# Patient Record
Sex: FEMALE | Race: OTHER | HISPANIC OR LATINO
[De-identification: names, ages, dates, MRNs, and addresses within clinical notes are randomized per-mention and may not be internally consistent; named-entity substitution may affect disease eponyms.]

---

## 2017-04-24 ENCOUNTER — APPOINTMENT (OUTPATIENT)
Dept: ENDOCRINOLOGY | Facility: CLINIC | Age: 47
End: 2017-04-24

## 2017-04-24 VITALS
BODY MASS INDEX: 32.66 KG/M2 | DIASTOLIC BLOOD PRESSURE: 80 MMHG | OXYGEN SATURATION: 98 % | SYSTOLIC BLOOD PRESSURE: 110 MMHG | HEART RATE: 68 BPM | HEIGHT: 61 IN | WEIGHT: 173 LBS

## 2017-05-08 LAB
T4 FREE SERPL-MCNC: 0.7 NG/DL
TSH SERPL-ACNC: 8.99 UIU/ML

## 2017-10-24 ENCOUNTER — APPOINTMENT (OUTPATIENT)
Dept: ENDOCRINOLOGY | Facility: CLINIC | Age: 47
End: 2017-10-24

## 2017-11-13 ENCOUNTER — RX RENEWAL (OUTPATIENT)
Age: 47
End: 2017-11-13

## 2017-12-04 ENCOUNTER — RX RENEWAL (OUTPATIENT)
Age: 47
End: 2017-12-04

## 2017-12-07 ENCOUNTER — APPOINTMENT (OUTPATIENT)
Dept: ORTHOPEDIC SURGERY | Facility: CLINIC | Age: 47
End: 2017-12-07

## 2018-09-21 ENCOUNTER — APPOINTMENT (OUTPATIENT)
Dept: OBGYN | Facility: CLINIC | Age: 48
End: 2018-09-21
Payer: COMMERCIAL

## 2018-09-21 VITALS
SYSTOLIC BLOOD PRESSURE: 116 MMHG | HEIGHT: 61 IN | DIASTOLIC BLOOD PRESSURE: 78 MMHG | BODY MASS INDEX: 33.04 KG/M2 | WEIGHT: 175 LBS | HEART RATE: 79 BPM

## 2018-09-21 DIAGNOSIS — Z01.419 ENCOUNTER FOR GYNECOLOGICAL EXAMINATION (GENERAL) (ROUTINE) W/OUT ABNORMAL FINDINGS: ICD-10-CM

## 2018-09-21 PROCEDURE — 99396 PREV VISIT EST AGE 40-64: CPT

## 2018-09-24 LAB — HPV HIGH+LOW RISK DNA PNL CVX: NOT DETECTED

## 2018-09-28 LAB — CYTOLOGY CVX/VAG DOC THIN PREP: NORMAL

## 2018-12-05 ENCOUNTER — RX RENEWAL (OUTPATIENT)
Age: 48
End: 2018-12-05

## 2019-01-08 ENCOUNTER — APPOINTMENT (OUTPATIENT)
Dept: ENDOCRINOLOGY | Facility: CLINIC | Age: 49
End: 2019-01-08
Payer: COMMERCIAL

## 2019-01-08 VITALS
SYSTOLIC BLOOD PRESSURE: 118 MMHG | DIASTOLIC BLOOD PRESSURE: 80 MMHG | HEART RATE: 86 BPM | OXYGEN SATURATION: 98 % | BODY MASS INDEX: 33.26 KG/M2 | WEIGHT: 176 LBS

## 2019-01-08 PROCEDURE — 99214 OFFICE O/P EST MOD 30 MIN: CPT

## 2019-01-08 RX ORDER — OSELTAMIVIR PHOSPHATE 75 MG/1
75 CAPSULE ORAL
Qty: 10 | Refills: 0 | Status: DISCONTINUED | COMMUNITY
Start: 2019-01-04

## 2019-01-08 RX ORDER — AMOXICILLIN 500 MG/1
500 CAPSULE ORAL
Qty: 20 | Refills: 0 | Status: DISCONTINUED | COMMUNITY
Start: 2018-12-03

## 2019-01-08 NOTE — PHYSICAL EXAM
[Alert] : alert [No Acute Distress] : no acute distress [Well Nourished] : well nourished [Normal Sclera/Conjunctiva] : normal sclera/conjunctiva [Normal Oropharynx] : the oropharynx was normal [Supple] : the neck was supple [No Respiratory Distress] : no respiratory distress [Normal Rate and Effort] : normal respiratory rhythm and effort [No Accessory Muscle Use] : no accessory muscle use [Clear to Auscultation] : lungs were clear to auscultation bilaterally [Normal Rate] : heart rate was normal  [Normal S1, S2] : normal S1 and S2 [Regular Rhythm] : with a regular rhythm [Pedal Pulses Normal] : the pedal pulses are present [No Edema] : there was no peripheral edema [No Varicosities] : there were no varicosital changes [Normal Bowel Sounds] : normal bowel sounds [Not Tender] : non-tender [Soft] : abdomen soft [Not Distended] : not distended [Post Cervical Nodes] : posterior cervical nodes [Anterior Cervical Nodes] : anterior cervical nodes [No Spinal Tenderness] : no spinal tenderness [No Stigmata of Cushings Syndrome] : no stigmata of cushings syndrome [Normal Gait] : normal gait [No Clubbing, Cyanosis] : no clubbing  or cyanosis of the fingernails [No Involuntary Movements] : no involuntary movements were seen [Normal Reflexes] : deep tendon reflexes were 2+ and symmetric [No Motor Deficits] : the motor exam was normal [No Tremors] : no tremors [Oriented x3] : oriented to person, place, and time [Normal Affect] : the affect was normal [Normal Mood] : the mood was normal [Kyphosis] : no kyphosis present [de-identified] : +multinodular goiter [de-identified] : normal

## 2019-01-08 NOTE — RESULTS/DATA
[Hologic] : hologic [L1 - L4] : L1 - L4 [T-Score ___] : T-score: [unfilled] [FreeTextEntry2] : 11/2016 [FreeTextEntry1] : 5/2013: Spine -0.3 Femoral Neck -1.1

## 2019-01-08 NOTE — REVIEW OF SYSTEMS
[Nasal Congestion] : nasal congestion [Polydipsia] : polydipsia [All other systems negative] : All other systems negative [Fatigue] : no fatigue [Recent Weight Gain (___ Lbs)] : no recent weight gain [Recent Weight Loss (___ Lbs)] : no recent weight loss [Fever] : no fever [Chills] : no chills [Blurry Vision] : no blurred vision [Dysphagia] : no dysphagia [Dysphonia] : no dysphonia [Neck Pain] : no neck pain [Chest Pain] : no chest pain [Palpitations] : no palpitations [Shortness Of Breath] : no shortness of breath [Wheezing] : no wheezing was heard [Cough] : no cough [SOB on Exertion] : no shortness of breath during exertion [Nausea] : no nausea [Vomiting] : no vomiting was observed [Constipation] : no constipation [Diarrhea] : no diarrhea [Polyuria] : no polyuria [Depression] : no depression [Anxiety] : no anxiety [FreeTextEntry6] : +snoring [FreeTextEntry8] : premature menopause

## 2019-01-08 NOTE — HISTORY OF PRESENT ILLNESS
[FreeTextEntry1] : 47 y/o F hx of hypothyroidism since age 8, seen initially by me 2014, was supposed to be on levothyroxine 100 mcg, but not taking it. Never got the prescription and forgot to take. Returned for follow-up  back on levothyroxine 100 mcg daily with TSH 0.32 Free T4 1.5 (10/2016) from 1.10 (2015). +Mother and grandmother with hx of hypothyroidism. Initially had +fatigue, +forgetfulness, +cold intolerance, +constipation, +dry skin, +weight gain. +occasional palpitations. +nausea. Now most symptoms improved, but had noticed 20 lb. weight gain now stable. LMP . Worked up by Dr. Bond unclear reason for premature menopause. Recommended Premarin, but did not take it. Noticed enlargement of neck. Hx of thyroid U/S with multinodular goiter. With FNA of 1 nodule  which was benign. Last thyroid U/S 10/2016 with heterogenous thyroid with pseudo nodules. No dysphasia, no dysphonia, no orthopnea. No hx of childhood radiation exposure. . No hx of thyroid d/o during pregnancy. Last DXA 2016 with osteopenia. Has not been taking levothyroxine for the past 3 weeks as she ran out of the medication.

## 2019-01-08 NOTE — PROCEDURE
[[employer] e 2008 model, 10-12 MHz frequencies] : multiple real time longitudinal and transverse images were obtained using a high resolution ultrasound with a linear transducer, [employer] e 2008 model, 10-12 MHz frequencies. All measurements will be reported as longitudinal x dwayne-posterior x transverse. [Report dated ___] : Report dated [unfilled] [Multinodular Goiter] : multinodular goiter [] : pseudomacronodular architecture [There are no distinct nodules visualized.] : There are no distinct nodules visualized. [FreeTextEntry1] : 4.50 x 1.98 x 2.19 [FreeTextEntry5] : 4.82 x 2.43 x 2.24 [FreeTextEntry2] : 0.86

## 2019-01-08 NOTE — DATA REVIEWED
[FreeTextEntry1] : DXA 11/2016: Spine -1.6 Femoral Neck -1.4\par \par Labs 10/2016:\par TSH 0.32\par Free T4 1.5\par TPO Ab +\par CBC normal\par CMP normal except AlkP of 124\par LDL 40\par HDL 50\par Chol 158\par Trig 342\par A1c 5.3%\par \par Thyroid Ultrasound Report 10/19/16:\par \par Technique: multiple real time longitudinal and transverse images were obtained using a high resolution ultrasound with a linear transducer, [employer] e 2008 model, 10-12 MHz frequencies. All measurements will be reported as longitudinal x dwayne-posterior x transverse. \par Comparison: Report dated 8/2015. \par \par Indication: multinodular goiter. \par \par Findings: \par The right thyroid lobe measures 4.50 x 1.98 x 2.19 cm. The left thyroid lobe measures 4.82 x 2.43 x 2.24 cm. The isthmus measures 0.86 cm. \par The right thyroid lobe has a heterogeneous parenchyma. It has multiple ill defined areas of hypoechogenicity and pseudomacronodular architecture. \par The left thyroid lobe has a heterogeneous parenchyma . It has multiple ill defined areas of hypoechogenicity and pseudomacronodular architecture. \par \par There are no distinct nodules visualized. \par \par \par Labs 8/2015:\par TSH 1.10\par \par Thyroid Ultrasound Report 4/24/15:\par Technique: Multiple real time longitudinal and transverse images were obtained using a high resolution ultrasound with a linear transducer, [employer] e 2008 model, 10-12 MHz frequencies. All measurements will be reported as longitudinal x dwayne-posterior x transverse. \par Comparison: Report dated 8/2014. \par \par Indication: multinodular goiter. \par \par Findings: \par The right thyroid lobe measures 4.81 x 2.02 x 1.93 cm. The left thyroid lobe measures 4.82 x 2.24 x 2.64 cm. The isthmus measures 0.80 cm. \par The right thyroid lobe has a heterogeneous parenchyma. It has multiple ill defined areas of hypoechogenicity and pseudomacronodular architecture. \par The left thyroid lobe has a heterogeneous parenchyma . It has multiple ill defined areas of hypoechogenicity and pseudomacronodular architecture. \par \par There are no distinct nodules visualized. \par \par Labs 4/21/15:\par TSH 1.35\par Free T4 1.1\par TPO Ab +\par \par Prolactin 19.8\par TSH 5.9 (2012)\par \par Thyroid U/S performed today in office 8/6/14- Bilaterally enlarged heterogenous gland with nodularity. 1 discrete nodule in right lower lobe measuring 0.85x1.16x1.19 hypoechoic, no internal vascularity or calcifications, regular margins.  Isthmus measures 0.83cm, Right Lobe 2.24x2.16x3.93, Left Lobe 2.37x2.43x4.62. Appears consistent with Hashimoto's.

## 2019-05-28 ENCOUNTER — APPOINTMENT (OUTPATIENT)
Dept: ORTHOPEDIC SURGERY | Facility: CLINIC | Age: 49
End: 2019-05-28

## 2019-10-17 ENCOUNTER — RX RENEWAL (OUTPATIENT)
Age: 49
End: 2019-10-17

## 2019-11-05 ENCOUNTER — RX RENEWAL (OUTPATIENT)
Age: 49
End: 2019-11-05

## 2019-11-19 ENCOUNTER — RX RENEWAL (OUTPATIENT)
Age: 49
End: 2019-11-19

## 2020-02-11 ENCOUNTER — APPOINTMENT (OUTPATIENT)
Dept: ENDOCRINOLOGY | Facility: CLINIC | Age: 50
End: 2020-02-11
Payer: COMMERCIAL

## 2020-02-11 VITALS
HEIGHT: 61 IN | SYSTOLIC BLOOD PRESSURE: 118 MMHG | HEART RATE: 75 BPM | OXYGEN SATURATION: 96 % | BODY MASS INDEX: 34.74 KG/M2 | TEMPERATURE: 98.2 F | WEIGHT: 184 LBS | DIASTOLIC BLOOD PRESSURE: 79 MMHG | RESPIRATION RATE: 16 BRPM

## 2020-02-11 PROCEDURE — 99214 OFFICE O/P EST MOD 30 MIN: CPT

## 2020-02-11 NOTE — PHYSICAL EXAM
[Alert] : alert [No Acute Distress] : no acute distress [Well Nourished] : well nourished [Normal Sclera/Conjunctiva] : normal sclera/conjunctiva [Normal Oropharynx] : the oropharynx was normal [Supple] : the neck was supple [No Respiratory Distress] : no respiratory distress [Normal Rate and Effort] : normal respiratory rhythm and effort [No Accessory Muscle Use] : no accessory muscle use [Clear to Auscultation] : lungs were clear to auscultation bilaterally [Normal Rate] : heart rate was normal  [Normal S1, S2] : normal S1 and S2 [Regular Rhythm] : with a regular rhythm [Pedal Pulses Normal] : the pedal pulses are present [No Edema] : there was no peripheral edema [No Varicosities] : there were no varicosital changes [Normal Bowel Sounds] : normal bowel sounds [Not Tender] : non-tender [Soft] : abdomen soft [Not Distended] : not distended [Post Cervical Nodes] : posterior cervical nodes [Anterior Cervical Nodes] : anterior cervical nodes [No Spinal Tenderness] : no spinal tenderness [No Stigmata of Cushings Syndrome] : no stigmata of cushings syndrome [Normal Gait] : normal gait [No Clubbing, Cyanosis] : no clubbing  or cyanosis of the fingernails [No Involuntary Movements] : no involuntary movements were seen [Normal Reflexes] : deep tendon reflexes were 2+ and symmetric [No Motor Deficits] : the motor exam was normal [No Tremors] : no tremors [Oriented x3] : oriented to person, place, and time [Normal Affect] : the affect was normal [Normal Mood] : the mood was normal [de-identified] : normal [Kyphosis] : no kyphosis present [de-identified] : +multinodular goiter

## 2020-02-11 NOTE — HISTORY OF PRESENT ILLNESS
[FreeTextEntry1] : 49 y/o F hx of hypothyroidism since age 8, seen initially by me 2014, was supposed to be on levothyroxine 100 mcg, but not taking it. Never got the prescription and forgot to take. Returned for follow-up  back on levothyroxine 100 mcg daily with TSH 0.32 Free T4 1.5 (10/2016) from 1.10 (2015). +Mother and grandmother with hx of hypothyroidism. Initially had +fatigue, +forgetfulness, +cold intolerance, +constipation, +dry skin, +weight gain. +occasional palpitations. +nausea. Now most symptoms improved, but had noticed 20 lb. weight gain. LMP . Worked up by Dr. Bnod unclear reason for premature menopause. Recommended Premarin, but did not take it. Noticed enlargement of neck. Hx of thyroid U/S with multinodular goiter. With FNA of 1 nodule  which was benign. Last thyroid U/S 10/2016 with heterogenous thyroid with pseudo nodules. No dysphasia, no dysphonia, no orthopnea. Reports increased snoring, apnea symptoms noted by  and SOB during the day. No hx of childhood radiation exposure. . No hx of thyroid d/o during pregnancy. Last DXA 2016 with osteopenia.

## 2020-02-11 NOTE — REVIEW OF SYSTEMS
[Polydipsia] : polydipsia [All other systems negative] : All other systems negative [Fatigue] : fatigue [Recent Weight Gain (___ Lbs)] : recent [unfilled] ~Ulb weight gain [Shortness Of Breath] : shortness of breath [SOB on Exertion] : shortness of breath during exertion [Recent Weight Loss (___ Lbs)] : no recent weight loss [Fever] : no fever [Chills] : no chills [Dysphagia] : no dysphagia [Blurry Vision] : no blurred vision [Dysphonia] : no dysphonia [Chest Pain] : no chest pain [Neck Pain] : no neck pain [Nasal Congestion] : no nasal congestion [Wheezing] : no wheezing was heard [Cough] : no cough [Palpitations] : no palpitations [Nausea] : no nausea [Vomiting] : no vomiting was observed [Constipation] : no constipation [Diarrhea] : no diarrhea [Polyuria] : no polyuria [FreeTextEntry6] : +snoring [Depression] : no depression [Anxiety] : no anxiety [FreeTextEntry8] : premature menopause

## 2020-02-11 NOTE — PROCEDURE
[[employer] e 2008 model, 10-12 MHz frequencies] : multiple real time longitudinal and transverse images were obtained using a high resolution ultrasound with a linear transducer, [employer] e 2008 model, 10-12 MHz frequencies. All measurements will be reported as longitudinal x dwayne-posterior x transverse. [Report dated ___] : Report dated [unfilled] [Multinodular Goiter] : multinodular goiter [] : pseudomacronodular architecture [There are no distinct nodules visualized.] : There are no distinct nodules visualized. [FreeTextEntry1] : 4.50 x 1.98 x 2.19 [FreeTextEntry2] : 0.86 [FreeTextEntry5] : 4.82 x 2.43 x 2.24

## 2020-02-11 NOTE — DATA REVIEWED
[FreeTextEntry1] : DXA 11/2016: Spine -1.6 Femoral Neck -1.4\par \par Labs 10/2016:\par TSH 0.32\par Free T4 1.5\par TPO Ab +\par CBC normal\par CMP normal except AlkP of 124\par LDL 40\par HDL 50\par Chol 158\par Trig 342\par A1c 5.3%\par \par Thyroid Ultrasound Report 10/19/16:\par \par Technique: multiple real time longitudinal and transverse images were obtained using a high resolution ultrasound with a linear transducer, [employer] e 2008 model, 10-12 MHz frequencies. All measurements will be reported as longitudinal x dwayne-posterior x transverse. \par Comparison: Report dated 8/2015. \par \par Indication: multinodular goiter. \par \par Findings: \par The right thyroid lobe measures 4.50 x 1.98 x 2.19 cm. The left thyroid lobe measures 4.82 x 2.43 x 2.24 cm. The isthmus measures 0.86 cm. \par The right thyroid lobe has a heterogeneous parenchyma. It has multiple ill defined areas of hypoechogenicity and pseudomacronodular architecture. \par The left thyroid lobe has a heterogeneous parenchyma . It has multiple ill defined areas of hypoechogenicity and pseudomacronodular architecture. \par \par There are no distinct nodules visualized. \par \par \par Labs 8/2015:\par TSH 1.10\par \par Thyroid Ultrasound Report 4/24/15:\par Technique: Multiple real time longitudinal and transverse images were obtained using a high resolution ultrasound with a linear transducer, [employer] e 2008 model, 10-12 MHz frequencies. All measurements will be reported as longitudinal x dwayne-posterior x transverse. \par Comparison: Report dated 8/2014. \par \par Indication: multinodular goiter. \par \par Findings: \par The right thyroid lobe measures 4.81 x 2.02 x 1.93 cm. The left thyroid lobe measures 4.82 x 2.24 x 2.64 cm. The isthmus measures 0.80 cm. \par The right thyroid lobe has a heterogeneous parenchyma. It has multiple ill defined areas of hypoechogenicity and pseudomacronodular architecture. \par The left thyroid lobe has a heterogeneous parenchyma . It has multiple ill defined areas of hypoechogenicity and pseudomacronodular architecture. \par \par There are no distinct nodules visualized. \par \par Labs 4/21/15:\par TSH 1.35\par Free T4 1.1\par TPO Ab +\par \par Prolactin 19.8\par TSH 5.9 (2012)\par \par Thyroid U/S performed today in office 8/6/14- Bilaterally enlarged heterogenous gland with nodularity. 1 discrete nodule in right lower lobe measuring 0.85x1.16x1.19 hypoechoic, no internal vascularity or calcifications, regular margins.  Isthmus measures 0.83cm, Right Lobe 2.24x2.16x3.93, Left Lobe 2.37x2.43x4.62. Appears consistent with Hashimoto's.

## 2020-02-11 NOTE — ASSESSMENT
[FreeTextEntry1] : 49 y/o F w/\par \par 1. Hypothyroidism- Likely Hashimoto's given family hx and ultrasound findings. c/w levothyroxine at 100 mcg daily. Pt. to take daily in am on empty stomach at least 30 min prior to breakfast. Will repeat TFTs today. \par \par 2. Multinodular Goiter- consistent with Hashimoto's. Thyroid U/S performed 10/2016 (see results section for full description). She currently has no symptoms of tracheal compression or obstruction. Appears to be more of a cosmetic concern. Will continue to monitor with serial ultrasounds every 12  months to reassess nodularity. Will hold off on any FNA at this time at the nodule does not appear suspicious. Will have patient send prior ultrasound reports and pathology report from prior evaluation of thyroid nodules. \par \par 3. [place]- recommend sleep study

## 2020-02-12 LAB
T4 FREE SERPL-MCNC: 0.6 NG/DL
TSH SERPL-ACNC: 28.6 UIU/ML

## 2020-02-23 ENCOUNTER — FORM ENCOUNTER (OUTPATIENT)
Age: 50
End: 2020-02-23

## 2020-04-25 ENCOUNTER — MESSAGE (OUTPATIENT)
Age: 50
End: 2020-04-25

## 2020-05-04 ENCOUNTER — APPOINTMENT (OUTPATIENT)
Dept: DISASTER EMERGENCY | Facility: CLINIC | Age: 50
End: 2020-05-04

## 2020-05-05 LAB
SARS-COV-2 IGG SERPL IA-ACNC: <0.1 INDEX
SARS-COV-2 IGG SERPL QL IA: NEGATIVE

## 2020-06-03 ENCOUNTER — NON-APPOINTMENT (OUTPATIENT)
Age: 50
End: 2020-06-03

## 2020-07-12 ENCOUNTER — FORM ENCOUNTER (OUTPATIENT)
Age: 50
End: 2020-07-12

## 2020-08-03 ENCOUNTER — APPOINTMENT (OUTPATIENT)
Dept: ENDOCRINOLOGY | Facility: CLINIC | Age: 50
End: 2020-08-03
Payer: SELF-PAY

## 2020-08-03 VITALS
HEIGHT: 61 IN | WEIGHT: 176 LBS | DIASTOLIC BLOOD PRESSURE: 71 MMHG | RESPIRATION RATE: 15 BRPM | OXYGEN SATURATION: 95 % | TEMPERATURE: 98.3 F | BODY MASS INDEX: 33.23 KG/M2 | HEART RATE: 96 BPM | SYSTOLIC BLOOD PRESSURE: 106 MMHG

## 2020-08-03 PROCEDURE — 99214 OFFICE O/P EST MOD 30 MIN: CPT

## 2020-08-04 NOTE — ASSESSMENT
[FreeTextEntry1] : 51 y/o F w/\par \par 1. Iatrogenic Hyperthyoiidism- Likely Hashimoto's given family hx and ultrasound findings. decrease levothyroxine to 75 mcg daily. Pt. to take daily in am on empty stomach at least 30 min prior to breakfast. Will repeat TFTs in 8 weeks. \par \par 2. Multinodular Goiter- consistent with Hashimoto's. Thyroid U/S performed 10/2016 (see results section for full description). She currently has no symptoms of tracheal compression or obstruction. Appears to be more of a cosmetic concern. Will continue to monitor with serial ultrasounds every 12  months to reassess nodularity. Will hold off on any FNA at this time at the nodule does not appear suspicious. Will have patient send prior ultrasound reports and pathology report from prior evaluation of thyroid nodules. \par \par 3. [place]- recommended sleep study with moderate [place], refer to pulmonary to obtain CPAP.

## 2020-08-04 NOTE — DATA REVIEWED
[FreeTextEntry1] : Labs \par TSH 0.08\par Free T4 1.4\par LFTs normal\par \par DXA 11/2016: Spine -1.6 Femoral Neck -1.4\par \par Labs 10/2016:\par TSH 0.32\par Free T4 1.5\par TPO Ab +\par CBC normal\par CMP normal except AlkP of 124\par LDL 40\par HDL 50\par Chol 158\par Trig 342\par A1c 5.3%\par \par Thyroid Ultrasound Report 10/19/16:\par \par Technique: multiple real time longitudinal and transverse images were obtained using a high resolution ultrasound with a linear transducer, [employer] e 2008 model, 10-12 MHz frequencies. All measurements will be reported as longitudinal x dwayne-posterior x transverse. \par Comparison: Report dated 8/2015. \par \par Indication: multinodular goiter. \par \par Findings: \par The right thyroid lobe measures 4.50 x 1.98 x 2.19 cm. The left thyroid lobe measures 4.82 x 2.43 x 2.24 cm. The isthmus measures 0.86 cm. \par The right thyroid lobe has a heterogeneous parenchyma. It has multiple ill defined areas of hypoechogenicity and pseudomacronodular architecture. \par The left thyroid lobe has a heterogeneous parenchyma . It has multiple ill defined areas of hypoechogenicity and pseudomacronodular architecture. \par \par There are no distinct nodules visualized. \par \par \par Labs 8/2015:\par TSH 1.10\par \par Thyroid Ultrasound Report 4/24/15:\par Technique: Multiple real time longitudinal and transverse images were obtained using a high resolution ultrasound with a linear transducer, [employer] e 2008 model, 10-12 MHz frequencies. All measurements will be reported as longitudinal x dwayne-posterior x transverse. \par Comparison: Report dated 8/2014. \par \par Indication: multinodular goiter. \par \par Findings: \par The right thyroid lobe measures 4.81 x 2.02 x 1.93 cm. The left thyroid lobe measures 4.82 x 2.24 x 2.64 cm. The isthmus measures 0.80 cm. \par The right thyroid lobe has a heterogeneous parenchyma. It has multiple ill defined areas of hypoechogenicity and pseudomacronodular architecture. \par The left thyroid lobe has a heterogeneous parenchyma . It has multiple ill defined areas of hypoechogenicity and pseudomacronodular architecture. \par \par There are no distinct nodules visualized. \par \par Labs 4/21/15:\par TSH 1.35\par Free T4 1.1\par TPO Ab +\par \par Prolactin 19.8\par TSH 5.9 (2012)\par \par Thyroid U/S performed today in office 8/6/14- Bilaterally enlarged heterogenous gland with nodularity. 1 discrete nodule in right lower lobe measuring 0.85x1.16x1.19 hypoechoic, no internal vascularity or calcifications, regular margins.  Isthmus measures 0.83cm, Right Lobe 2.24x2.16x3.93, Left Lobe 2.37x2.43x4.62. Appears consistent with Hashimoto's.

## 2020-08-04 NOTE — PHYSICAL EXAM
[Alert] : alert [Healthy Appearance] : healthy appearance [Well Nourished] : well nourished [No Acute Distress] : no acute distress [Well Developed] : well developed [No Proptosis] : no proptosis [Thyroid Not Enlarged] : the thyroid was not enlarged [No Thyroid Nodules] : no palpable thyroid nodules [No Respiratory Distress] : no respiratory distress [Normal Rate and Effort] : normal respiratory rate and effort [No Accessory Muscle Use] : no accessory muscle use [Normal S1, S2] : normal S1 and S2 [Clear to Auscultation] : lungs were clear to auscultation bilaterally [Normal Rate] : heart rate was normal [Normal Bowel Sounds] : normal bowel sounds [No Edema] : no peripheral edema [Regular Rhythm] : with a regular rhythm [Not Tender] : non-tender [Not Distended] : not distended [Kyphosis] : no kyphosis present [Soft] : abdomen soft [No Clubbing, Cyanosis] : no clubbing  or cyanosis of the fingernails [No Stigmata of Cushings Syndrome] : no stigmata of Cushings Syndrome [No Involuntary Movements] : no involuntary movements were seen [Normal Strength/Tone] : muscle strength and tone were normal [Acne] : no acne [No Motor Deficits] : the motor exam was normal [Oriented x3] : oriented to person, place, and time [No Tremors] : no tremors [Normal Affect] : the affect was normal [Normal Mood] : the mood was normal

## 2020-08-04 NOTE — REVIEW OF SYSTEMS
[Fatigue] : fatigue [Recent Weight Gain (___ Lbs)] : no recent weight gain [Dysphagia] : no dysphagia [Visual Field Defect] : no visual field defect [Recent Weight Loss (___ Lbs)] : no recent weight loss [Neck Pain] : no neck pain [Dysphonia] : no dysphonia [Palpitations] : palpitations [Chest Pain] : no chest pain [Shortness Of Breath] : no shortness of breath [Vomiting] : no vomiting [Nausea] : no nausea [Constipation] : no constipation [Diarrhea] : no diarrhea [Polyuria] : no polyuria [Joint Pain] : no joint pain [Tremors] : no tremors [All other systems negative] : All other systems negative [Heat Intolerance] : heat intolerance

## 2020-08-04 NOTE — HISTORY OF PRESENT ILLNESS
[FreeTextEntry1] : 49 y/o F hx of hypothyroidism since age 8, seen initially by me 2014, was supposed to be on levothyroxine 100 mcg, but not taking it. Never got the prescription and forgot to take. Returned for follow-up  back on levothyroxine 100 mcg daily with TSH 0.32 Free T4 1.5 (10/2016) from 1.10 (2015). Seen by cardiology with palpitations. Levothyroxine decreased to 88 mcg. Remains chemically hyperthyroid with some symptoms of anxiety and palpitations. +Mother and grandmother with hx of hypothyroidism. Initially had +fatigue, +forgetfulness, +cold intolerance, +constipation, +dry skin, +weight gain. +occasional palpitations. +nausea.  LMP . Worked up by Dr. Bond unclear reason for premature menopause. Recommended Premarin, but did not take it. Noticed enlargement of neck. Hx of thyroid U/S with multinodular goiter. With FNA of 1 nodule  which was benign. Last thyroid U/S 10/2016 with heterogenous thyroid with pseudo nodules. No dysphasia, no dysphonia, no orthopnea. Reports increased snoring, apnea symptoms noted by  and SOB during the day. Referred or sleep study with evidence of moderate seep apnea recommended CPAP has not yet started. No hx of childhood radiation exposure. . No hx of thyroid d/o during pregnancy. Last DXA 2016 with osteopenia.

## 2020-08-04 NOTE — PROCEDURE
[[employer] 2008 model, 10-12 MHz frequencies] : multiple real time longitudinal and transverse images were obtained using a high resolution ultrasound with a linear transducer, [employer] 2008 model, 10-12 MHz frequencies. All measurements will be reported as longitudinal x dwayne-posterior x transverse. [Report dated ___] : Report dated [unfilled] [Multinodular Goiter] : multinodular goiter [] : pseudomacronodular architecture [There are no distinct nodules visualized.] : There are no distinct nodules visualized. [FreeTextEntry2] : 0.86 [FreeTextEntry5] : 4.82 x 2.43 x 2.24 [FreeTextEntry1] : 4.50 x 1.98 x 2.19

## 2020-08-06 ENCOUNTER — APPOINTMENT (OUTPATIENT)
Dept: PULMONOLOGY | Facility: CLINIC | Age: 50
End: 2020-08-06

## 2020-11-10 ENCOUNTER — APPOINTMENT (OUTPATIENT)
Dept: ENDOCRINOLOGY | Facility: CLINIC | Age: 50
End: 2020-11-10

## 2020-11-17 ENCOUNTER — TRANSCRIPTION ENCOUNTER (OUTPATIENT)
Age: 50
End: 2020-11-17

## 2021-05-28 ENCOUNTER — RX RENEWAL (OUTPATIENT)
Age: 51
End: 2021-05-28

## 2021-12-13 ENCOUNTER — APPOINTMENT (OUTPATIENT)
Dept: OBGYN | Facility: CLINIC | Age: 51
End: 2021-12-13
Payer: COMMERCIAL

## 2021-12-13 DIAGNOSIS — R89.6 ABNORMAL CYTOLOGICAL FINDINGS IN SPECIMENS FROM OTHER ORGANS, SYSTEMS AND TISSUES: ICD-10-CM

## 2021-12-13 DIAGNOSIS — M25.562 PAIN IN LEFT KNEE: ICD-10-CM

## 2021-12-13 DIAGNOSIS — R94.5 ABNORMAL RESULTS OF LIVER FUNCTION STUDIES: ICD-10-CM

## 2021-12-13 PROCEDURE — 99396 PREV VISIT EST AGE 40-64: CPT

## 2021-12-13 RX ORDER — CEFUROXIME AXETIL 500 MG/1
500 TABLET ORAL
Qty: 28 | Refills: 0 | Status: COMPLETED | COMMUNITY
Start: 2021-11-02

## 2021-12-13 NOTE — HISTORY OF PRESENT ILLNESS
[Patient reported mammogram was normal] : Patient reported mammogram was normal [Patient reported breast sonogram was normal] : Patient reported breast sonogram was normal [Patient reported PAP Smear was normal] : Patient reported PAP Smear was normal [Patient reported colonoscopy was normal] : Patient reported colonoscopy was normal [FreeTextEntry1] : 50YO P2 LMP 2012 presents for checkup without complaints. [Mammogramdate] : 2019 [BreastSonogramDate] : 2019 [PapSmeardate] : 2018 [ColonoscopyDate] : 2019

## 2021-12-15 LAB — HPV HIGH+LOW RISK DNA PNL CVX: NOT DETECTED

## 2021-12-16 ENCOUNTER — OUTPATIENT (OUTPATIENT)
Dept: OUTPATIENT SERVICES | Facility: HOSPITAL | Age: 51
LOS: 1 days | End: 2021-12-16
Payer: COMMERCIAL

## 2021-12-16 ENCOUNTER — APPOINTMENT (OUTPATIENT)
Dept: CT IMAGING | Facility: CLINIC | Age: 51
End: 2021-12-16
Payer: COMMERCIAL

## 2021-12-16 DIAGNOSIS — J32.9 CHRONIC SINUSITIS, UNSPECIFIED: ICD-10-CM

## 2021-12-16 PROCEDURE — 70486 CT MAXILLOFACIAL W/O DYE: CPT

## 2021-12-16 PROCEDURE — 70486 CT MAXILLOFACIAL W/O DYE: CPT | Mod: 26

## 2021-12-21 LAB — CYTOLOGY CVX/VAG DOC THIN PREP: ABNORMAL

## 2022-05-04 ENCOUNTER — RX RENEWAL (OUTPATIENT)
Age: 52
End: 2022-05-04

## 2022-06-22 ENCOUNTER — APPOINTMENT (OUTPATIENT)
Dept: ENDOCRINOLOGY | Facility: CLINIC | Age: 52
End: 2022-06-22
Payer: COMMERCIAL

## 2022-06-22 VITALS
WEIGHT: 166 LBS | HEART RATE: 89 BPM | HEIGHT: 61 IN | SYSTOLIC BLOOD PRESSURE: 116 MMHG | DIASTOLIC BLOOD PRESSURE: 78 MMHG | RESPIRATION RATE: 18 BRPM | TEMPERATURE: 97.9 F | OXYGEN SATURATION: 95 % | BODY MASS INDEX: 31.34 KG/M2

## 2022-06-22 PROCEDURE — 99214 OFFICE O/P EST MOD 30 MIN: CPT

## 2022-06-22 RX ORDER — AZITHROMYCIN 250 MG/1
250 TABLET, FILM COATED ORAL
Qty: 6 | Refills: 0 | Status: DISCONTINUED | COMMUNITY
Start: 2022-01-26

## 2022-06-22 RX ORDER — PREDNISONE 20 MG/1
20 TABLET ORAL
Qty: 10 | Refills: 0 | Status: DISCONTINUED | COMMUNITY
Start: 2022-01-26

## 2022-06-22 NOTE — ASSESSMENT
[FreeTextEntry1] : 53 y/o F w/\par \par 1. Iatrogenic Hyperthyoiidism- Likely Hashimoto's given family hx and ultrasound findings. decreased levothyroxine to 75 mcg daily in 2020. Pt. has not followed up since. Advised pt. to take levothyroxine daily in am on empty stomach at least 30 min prior to breakfast. Will repeat TFTs. \par \par 2. Multinodular Goiter- consistent with Hashimoto's. Thyroid U/S performed 10/2016 (see results section for full description). She currently has no symptoms of tracheal compression or obstruction. Appears to be more of a cosmetic concern. Will continue to monitor with serial ultrasounds every 12  months to reassess nodularity. Will hold off on any FNA at this time at the nodule does not appear suspicious. Will have patient send prior ultrasound reports and pathology report from prior evaluation of thyroid nodules. \par \par 3. KENTON- now improved with weight loss\par \par Pt. has not been seen in almost 2 years. Prep time with review of labs and interval progress notes and consultations \par Discussion with patient regarding thyroid hormone regimen, KENTON, and multinodular goiter with management plan, risks and benefits, treatment options and goals of care answering all patients questions and addressing all concerns \par Focused Thyroid US performed today with discussion of results with the patient\par Post-visit completion charting and review \par Total Time 38 min\par \par Specifically causes, evaluation, treatment options, risks, complications, and benefits of available therapies were discussed. Questions were answered.\par \par The submitted E/M billing level for this visit reflects the total time spent on the day of the visit including face-to-face time spent with the patient, non-face-to-face review of medical records and relevant information, documentation, and asynchronous communication with the patient after a visit via phone, email, or patient’s EHR portal after the visit. \par The medical records reviewed are either scanned into the chart or reviewed with the patient using a patient’s electronic medical records portal for patients with records not available to [facility] via electronic transmission platforms from other institutions and labs. \par Time spend counseling and performing coordination of care was also included in determining the appropriate EM billing level.\par \par I have reviewed and verified information regarding the chief complaint and history recorded by the ancillary staff and/or the patient. I have independently reviewed and interpreted tests performed by other physicians and facilities as necessary. \par \par I have discussed with the patient differential diagnosis, reason for auxiliary tests if ordered, risks, benefits, alternatives, and complications of each form of therapy were discussed. \par \par

## 2022-06-22 NOTE — PHYSICAL EXAM
[Alert] : alert [Well Nourished] : well nourished [Healthy Appearance] : healthy appearance [No Acute Distress] : no acute distress [Well Developed] : well developed [No Proptosis] : no proptosis [Thyroid Not Enlarged] : the thyroid was not enlarged [No Thyroid Nodules] : no palpable thyroid nodules [No Respiratory Distress] : no respiratory distress [No Accessory Muscle Use] : no accessory muscle use [Normal Rate and Effort] : normal respiratory rate and effort [Clear to Auscultation] : lungs were clear to auscultation bilaterally [Normal S1, S2] : normal S1 and S2 [Normal Rate] : heart rate was normal [Regular Rhythm] : with a regular rhythm [No Edema] : no peripheral edema [Normal Bowel Sounds] : normal bowel sounds [Not Tender] : non-tender [Not Distended] : not distended [Soft] : abdomen soft [No Stigmata of Cushings Syndrome] : no stigmata of Cushings Syndrome [No Clubbing, Cyanosis] : no clubbing  or cyanosis of the fingernails [No Involuntary Movements] : no involuntary movements were seen [Normal Strength/Tone] : muscle strength and tone were normal [No Motor Deficits] : the motor exam was normal [No Tremors] : no tremors [Oriented x3] : oriented to person, place, and time [Normal Affect] : the affect was normal [Normal Mood] : the mood was normal [Kyphosis] : no kyphosis present [Acne] : no acne

## 2022-06-22 NOTE — PROCEDURE
[MOO[employer] e 2008 model, 10-12 MHz frequencies] : multiple real time longitudinal and transverse images were obtained using a high resolution ultrasound with a linear transducer, M[employer] e 2008 model, 10-12 MHz frequencies. All measurements will be reported as longitudinal x dwayne-posterior x transverse. [Report dated ___] : Report dated [unfilled] [Multinodular Goiter] : multinodular goiter [] : pseudomacronodular architecture [There are no distinct nodules visualized.] : There are no distinct nodules visualized. [FreeTextEntry1] : 4.50 x 1.98 x 2.19 [FreeTextEntry5] : 4.82 x 2.43 x 2.24 [FreeTextEntry2] : 0.86

## 2022-06-22 NOTE — DATA REVIEWED
[FreeTextEntry1] : Thyroid Ultrasound Report 6/22/2022:\par \par Technique: multiple real time longitudinal and transverse images were obtained using a high resolution ultrasound with a linear transducer, In[employer] e 2008 model, 10-12 MHz frequencies. All measurements will be reported as longitudinal x dwayne-posterior x transverse. \par Comparison: Report dated 8/2015. \par \par Indication: multinodular goiter. \par \par Findings: \par The right thyroid lobe measures 4.6 x 2.42 x 2.25 cm. The left thyroid lobe measures 4.8 x 2.57 x 2.32 cm. The isthmus measures 0.92 cm. \par The right thyroid lobe has a heterogeneous parenchyma. It has multiple ill defined areas of hypoechogenicity and pseudomacronodular architecture. \par The left thyroid lobe has a heterogeneous parenchyma . It has multiple ill defined areas of hypoechogenicity and pseudomacronodular architecture. \par \par There are no distinct nodules visualized. \par \par Labs 1/2022:\par Chemically euthyroid\par \par Labs 6/2020:\par TSH 0.08\par Free T4 1.4\par LFTs normal\par \par DXA 11/2016: Spine -1.6 Femoral Neck -1.4\par \par Labs 10/2016:\par TSH 0.32\par Free T4 1.5\par TPO Ab +\par CBC normal\par CMP normal except AlkP of 124\par LDL 40\par HDL 50\par Chol 158\par Trig 342\par A1c 5.3%\par \par Thyroid Ultrasound Report 10/19/16:\par \par Technique: multiple real time longitudinal and transverse images were obtained using a high resolution ultrasound with a linear transducer, InnoPad e 2008 model, 10-12 MHz frequencies. All measurements will be reported as longitudinal x dwayne-posterior x transverse. \par Comparison: Report dated 8/2015. \par \par Indication: multinodular goiter. \par \par Findings: \par The right thyroid lobe measures 4.50 x 1.98 x 2.19 cm. The left thyroid lobe measures 4.82 x 2.43 x 2.24 cm. The isthmus measures 0.86 cm. \par The right thyroid lobe has a heterogeneous parenchyma. It has multiple ill defined areas of hypoechogenicity and pseudomacronodular architecture. \par The left thyroid lobe has a heterogeneous parenchyma . It has multiple ill defined areas of hypoechogenicity and pseudomacronodular architecture. \par \par There are no distinct nodules visualized. \par \par \par Labs 8/2015:\par TSH 1.10\par \par Thyroid Ultrasound Report 4/24/15:\par Technique: Multiple real time longitudinal and transverse images were obtained using a high resolution ultrasound with a linear transducer, InnoPad e 2008 model, 10-12 MHz frequencies. All measurements will be reported as longitudinal x dwayne-posterior x transverse. \par Comparison: Report dated 8/2014. \par \par Indication: multinodular goiter. \par \par Findings: \par The right thyroid lobe measures 4.81 x 2.02 x 1.93 cm. The left thyroid lobe measures 4.82 x 2.24 x 2.64 cm. The isthmus measures 0.80 cm. \par The right thyroid lobe has a heterogeneous parenchyma. It has multiple ill defined areas of hypoechogenicity and pseudomacronodular architecture. \par The left thyroid lobe has a heterogeneous parenchyma . It has multiple ill defined areas of hypoechogenicity and pseudomacronodular architecture. \par \par There are no distinct nodules visualized. \par \par Labs 4/21/15:\par TSH 1.35\par Free T4 1.1\par TPO Ab +\par \par Prolactin 19.8\par TSH 5.9 (2012)\par \par Thyroid U/S performed today in office 8/6/14- Bilaterally enlarged heterogenous gland with nodularity. 1 discrete nodule in right lower lobe measuring 0.85x1.16x1.19 hypoechoic, no internal vascularity or calcifications, regular margins.  Isthmus measures 0.83cm, Right Lobe 2.24x2.16x3.93, Left Lobe 2.37x2.43x4.62. Appears consistent with Hashimoto's.

## 2022-06-22 NOTE — HISTORY OF PRESENT ILLNESS
[FreeTextEntry1] : 53 y/o F hx of hypothyroidism since age 8, seen initially by me 2014, was supposed to be on levothyroxine 100 mcg, but not taking it. Never got the prescription and forgot to take. Returned for follow-up  back on levothyroxine 100 mcg daily with TSH 0.32 Free T4 1.5 (10/2016) from 1.10 (2015). Seen by cardiology with palpitations. Levothyroxine decreased to 88 mcg. Remained chemically hyperthyroid until 2020 with some symptoms of anxiety and palpitations and TSH of 0.08. Recommended decrease dose to 75 mcg. Pt. has not followed up since. Has lost almost 20 lb. since last visit doing [place]. Feeling much better with less palpitations, improvement in fatigue, hot flashes and sleep apnea. Adherent to levothyroxine. Was chemically euthyroid with recent labs by PCP 2022. \par +Mother and grandmother with hx of hypothyroidism. \par LMP .  . No hx of thyroid d/o during pregnancy. Worked up by Dr. Bond unclear reason for premature menopause. Recommended Premarin, but did not take it. \par \par Noticed enlargement of neck. Hx of thyroid U/S with multinodular goiter. With FNA of 1 nodule  which was benign. Last thyroid U/S 10/2016 with heterogenous thyroid with pseudo nodules. No dysphasia, no dysphonia, no orthopnea. Reports  improved snoring. Referred or sleep study with evidence of moderate seep apnea recommended CPAP, but has not needed to use it. No hx of childhood radiation exposure. Last DXA 2016 with osteopenia.

## 2022-06-22 NOTE — REVIEW OF SYSTEMS
[All other systems negative] : All other systems negative [Fatigue] : no fatigue [Recent Weight Gain (___ Lbs)] : no recent weight gain [Recent Weight Loss (___ Lbs)] : no recent weight loss [Visual Field Defect] : no visual field defect [Dysphagia] : no dysphagia [Neck Pain] : no neck pain [Dysphonia] : no dysphonia [Chest Pain] : no chest pain [Palpitations] : no palpitations [Shortness Of Breath] : no shortness of breath [Nausea] : no nausea [Constipation] : no constipation [Vomiting] : no vomiting [Diarrhea] : no diarrhea [Polyuria] : no polyuria [Joint Pain] : no joint pain [Tremors] : no tremors [Heat Intolerance] : no heat intolerance

## 2022-12-23 ENCOUNTER — TRANSCRIPTION ENCOUNTER (OUTPATIENT)
Age: 52
End: 2022-12-23

## 2022-12-23 ENCOUNTER — APPOINTMENT (OUTPATIENT)
Dept: ULTRASOUND IMAGING | Facility: CLINIC | Age: 52
End: 2022-12-23

## 2022-12-23 ENCOUNTER — OUTPATIENT (OUTPATIENT)
Dept: OUTPATIENT SERVICES | Facility: HOSPITAL | Age: 52
LOS: 1 days | End: 2022-12-23
Payer: COMMERCIAL

## 2022-12-23 DIAGNOSIS — Z00.8 ENCOUNTER FOR OTHER GENERAL EXAMINATION: ICD-10-CM

## 2022-12-23 PROCEDURE — 76705 ECHO EXAM OF ABDOMEN: CPT

## 2022-12-23 PROCEDURE — 76705 ECHO EXAM OF ABDOMEN: CPT | Mod: 26

## 2023-01-09 ENCOUNTER — APPOINTMENT (OUTPATIENT)
Dept: ENDOCRINOLOGY | Facility: CLINIC | Age: 53
End: 2023-01-09
Payer: COMMERCIAL

## 2023-01-09 VITALS
DIASTOLIC BLOOD PRESSURE: 66 MMHG | BODY MASS INDEX: 32.1 KG/M2 | OXYGEN SATURATION: 97 % | SYSTOLIC BLOOD PRESSURE: 116 MMHG | WEIGHT: 170 LBS | HEART RATE: 71 BPM | HEIGHT: 61 IN | TEMPERATURE: 98.1 F

## 2023-01-09 PROCEDURE — 99214 OFFICE O/P EST MOD 30 MIN: CPT

## 2023-01-09 NOTE — DATA REVIEWED
[FreeTextEntry1] : Thyroid Ultrasound Report 6/22/2022:\par \par Technique: multiple real time longitudinal and transverse images were obtained using a high resolution ultrasound with a linear transducer, [employer] e 2008 model, 10-12 MHz frequencies. All measurements will be reported as longitudinal x dwayne-posterior x transverse. \par Comparison: Report dated 8/2015. \par \par Indication: multinodular goiter. \par \par Findings: \par The right thyroid lobe measures 4.6 x 2.42 x 2.25 cm. The left thyroid lobe measures 4.8 x 2.57 x 2.32 cm. The isthmus measures 0.92 cm. \par The right thyroid lobe has a heterogeneous parenchyma. It has multiple ill defined areas of hypoechogenicity and pseudomacronodular architecture. \par The left thyroid lobe has a heterogeneous parenchyma . It has multiple ill defined areas of hypoechogenicity and pseudomacronodular architecture. \par \par There are no distinct nodules visualized. \par \par Labs 1/2022:\par Chemically euthyroid\par \par Labs 6/2020:\par TSH 0.08\par Free T4 1.4\par LFTs normal\par \par DXA 11/2016: Spine -1.6 Femoral Neck -1.4\par \par Labs 10/2016:\par TSH 0.32\par Free T4 1.5\par TPO Ab +\par CBC normal\par CMP normal except AlkP of 124\par LDL 40\par HDL 50\par Chol 158\par Trig 342\par A1c 5.3%\par \par Thyroid Ultrasound Report 10/19/16:\par \par Technique: multiple real time longitudinal and transverse images were obtained using a high resolution ultrasound with a linear transducer, [employer] e 2008 model, 10-12 MHz frequencies. All measurements will be reported as longitudinal x dwayne-posterior x transverse. \par Comparison: Report dated 8/2015. \par \par Indication: multinodular goiter. \par \par Findings: \par The right thyroid lobe measures 4.50 x 1.98 x 2.19 cm. The left thyroid lobe measures 4.82 x 2.43 x 2.24 cm. The isthmus measures 0.86 cm. \par The right thyroid lobe has a heterogeneous parenchyma. It has multiple ill defined areas of hypoechogenicity and pseudomacronodular architecture. \par The left thyroid lobe has a heterogeneous parenchyma . It has multiple ill defined areas of hypoechogenicity and pseudomacronodular architecture. \par \par There are no distinct nodules visualized. \par \par \par Labs 8/2015:\par TSH 1.10\par \par Thyroid Ultrasound Report 4/24/15:\par Technique: Multiple real time longitudinal and transverse images were obtained using a high resolution ultrasound with a linear transducer, [employer] e 2008 model, 10-12 MHz frequencies. All measurements will be reported as longitudinal x dwayne-posterior x transverse. \par Comparison: Report dated 8/2014. \par \par Indication: multinodular goiter. \par \par Findings: \par The right thyroid lobe measures 4.81 x 2.02 x 1.93 cm. The left thyroid lobe measures 4.82 x 2.24 x 2.64 cm. The isthmus measures 0.80 cm. \par The right thyroid lobe has a heterogeneous parenchyma. It has multiple ill defined areas of hypoechogenicity and pseudomacronodular architecture. \par The left thyroid lobe has a heterogeneous parenchyma . It has multiple ill defined areas of hypoechogenicity and pseudomacronodular architecture. \par \par There are no distinct nodules visualized. \par \par Labs 4/21/15:\par TSH 1.35\par Free T4 1.1\par TPO Ab +\par \par Prolactin 19.8\par TSH 5.9 (2012)\par \par Thyroid U/S performed today in office 8/6/14- Bilaterally enlarged heterogenous gland with nodularity. 1 discrete nodule in right lower lobe measuring 0.85x1.16x1.19 hypoechoic, no internal vascularity or calcifications, regular margins.  Isthmus measures 0.83cm, Right Lobe 2.24x2.16x3.93, Left Lobe 2.37x2.43x4.62. Appears consistent with Hashimoto's.

## 2023-01-09 NOTE — ASSESSMENT
[FreeTextEntry1] : 53 y/o F w/\par \par 1.Hypothyroidism- Likely Hashimoto's given family hx and ultrasound findings. decreased levothyroxine to 75 mcg daily in 2020. Clinically and chemically euthyroid with last TFTs 12/2022 by PCP Dr. Solorio. Advised pt. to take levothyroxine daily in am on empty stomach at least 30 min prior to breakfast. \par \par 2. Multinodular Goiter- consistent with Hashimoto's. Thyroid U/S performed 10/2016 (see results section for full description). She currently has no symptoms of tracheal compression or obstruction. Appears to be more of a cosmetic concern. Will continue to monitor. Will have patient send prior ultrasound reports and pathology report from prior evaluation of thyroid nodules. \par \par 3. KENTON- now improved with weight loss\par \par 4. Elevated Liver Function Tests- hx of hepatitis. Possible NAFLD. Follow-up with hepatology. \par \par Discussion with patient regarding thyroid hormone regimen, KENTON, and multinodular goiter with management plan, risks and benefits, treatment options and goals of care answering all patients questions and addressing all concerns \par Post-visit completion charting and review \par Total Time 30 min\par \par Specifically causes, evaluation, treatment options, risks, complications, and benefits of available therapies were discussed. Questions were answered.\par \par The submitted E/M billing level for this visit reflects the total time spent on the day of the visit including face-to-face time spent with the patient, non-face-to-face review of medical records and relevant information, documentation, and asynchronous communication with the patient after a visit via phone, email, or patient’s EHR portal after the visit. \par The medical records reviewed are either scanned into the chart or reviewed with the patient using a patient’s electronic medical records portal for patients with records not available to [facility] via electronic transmission platforms from other institutions and labs. \par Time spend counseling and performing coordination of care was also included in determining the appropriate EM billing level.\par \par I have reviewed and verified information regarding the chief complaint and history recorded by the ancillary staff and/or the patient. I have independently reviewed and interpreted tests performed by other physicians and facilities as necessary. \par \par I have discussed with the patient differential diagnosis, reason for auxiliary tests if ordered, risks, benefits, alternatives, and complications of each form of therapy were discussed. \par \par

## 2023-01-09 NOTE — REVIEW OF SYSTEMS
[Recent Weight Loss (___ Lbs)] : recent weight loss: [unfilled] lbs [All other systems negative] : All other systems negative [Fatigue] : no fatigue [Recent Weight Gain (___ Lbs)] : no recent weight gain [Visual Field Defect] : no visual field defect [Dysphagia] : no dysphagia [Neck Pain] : no neck pain [Dysphonia] : no dysphonia [Chest Pain] : no chest pain [Palpitations] : no palpitations [Shortness Of Breath] : no shortness of breath [Nausea] : no nausea [Constipation] : no constipation [Vomiting] : no vomiting [Diarrhea] : no diarrhea [Polyuria] : no polyuria [Joint Pain] : no joint pain [Tremors] : no tremors [Heat Intolerance] : no heat intolerance

## 2023-01-09 NOTE — HISTORY OF PRESENT ILLNESS
[FreeTextEntry1] : 51 y/o F hx of hypothyroidism since age 8, seen initially by me 2014, was supposed to be on levothyroxine 100 mcg, but not taking it. Never got the prescription and forgot to take. Returned for follow-up  back on levothyroxine 100 mcg daily with TSH 0.32 Free T4 1.5 (10/2016) from 1.10 (2015). Seen by cardiology with palpitations. Levothyroxine decreased to 88 mcg. Remained chemically hyperthyroid until 2020 with some symptoms of anxiety and palpitations and TSH of 0.08. Recommended decrease dose to 75 mcg. Lost 20 lb. doing [place]. Weight has remained off. Feeling much better with less palpitations, improvement in fatigue, hot flashes and sleep apnea. Adherent to levothyroxine. Was chemically euthyroid with recent labs by PCP 2022. \par +Mother and grandmother with hx of hypothyroidism. \par LMP .  . No hx of thyroid d/o during pregnancy. Worked up by Dr. Bond unclear reason for premature menopause. Recommended Premarin, but did not take it. \par \par Noticed enlargement of neck. Hx of thyroid U/S with multinodular goiter. With FNA of 1 nodule  which was benign. Last thyroid U/S 10/2016 with heterogenous thyroid with pseudo nodules. No dysphasia, no dysphonia, no orthopnea. Reports  improved snoring. Referred or sleep study with evidence of moderate seep apnea recommended CPAP, but has not needed to use it. No hx of childhood radiation exposure. Last DXA 2016 with osteopenia.

## 2023-01-09 NOTE — PROCEDURE
[[employer] e 2008 model, 10-12 MHz frequencies] : multiple real time longitudinal and transverse images were obtained using a high resolution ultrasound with a linear transducer, Spot[employer] e 2008 model, 10-12 MHz frequencies. All measurements will be reported as longitudinal x dwayne-posterior x transverse. [Report dated ___] : Report dated [unfilled] [Multinodular Goiter] : multinodular goiter [] : pseudomacronodular architecture [There are no distinct nodules visualized.] : There are no distinct nodules visualized. [FreeTextEntry1] : 4.50 x 1.98 x 2.19 [FreeTextEntry5] : 4.82 x 2.43 x 2.24 [FreeTextEntry2] : 0.86

## 2023-01-12 RX ORDER — BENZONATATE 200 MG/1
200 CAPSULE ORAL
Qty: 21 | Refills: 0 | Status: DISCONTINUED | COMMUNITY
Start: 2022-10-26

## 2023-01-26 RX ORDER — SEMAGLUTIDE 0.25 MG/.5ML
0.25 INJECTION, SOLUTION SUBCUTANEOUS
Qty: 3 | Refills: 3 | Status: COMPLETED | COMMUNITY
Start: 2023-01-12 | End: 2023-01-26

## 2023-01-27 ENCOUNTER — OUTPATIENT (OUTPATIENT)
Dept: OUTPATIENT SERVICES | Facility: HOSPITAL | Age: 53
LOS: 1 days | End: 2023-01-27
Payer: COMMERCIAL

## 2023-01-27 VITALS
HEIGHT: 60 IN | HEART RATE: 78 BPM | OXYGEN SATURATION: 98 % | TEMPERATURE: 97 F | DIASTOLIC BLOOD PRESSURE: 84 MMHG | WEIGHT: 174.61 LBS | RESPIRATION RATE: 16 BRPM | SYSTOLIC BLOOD PRESSURE: 135 MMHG

## 2023-01-27 DIAGNOSIS — Z98.51 TUBAL LIGATION STATUS: Chronic | ICD-10-CM

## 2023-01-27 DIAGNOSIS — Z01.818 ENCOUNTER FOR OTHER PREPROCEDURAL EXAMINATION: ICD-10-CM

## 2023-01-27 DIAGNOSIS — D21.22 BENIGN NEOPLASM OF CONNECTIVE AND OTHER SOFT TISSUE OF LEFT LOWER LIMB, INCLUDING HIP: ICD-10-CM

## 2023-01-27 DIAGNOSIS — Z98.890 OTHER SPECIFIED POSTPROCEDURAL STATES: Chronic | ICD-10-CM

## 2023-01-27 DIAGNOSIS — D17.22 BENIGN LIPOMATOUS NEOPLASM OF SKIN AND SUBCUTANEOUS TISSUE OF LEFT ARM: ICD-10-CM

## 2023-01-27 DIAGNOSIS — D21.12 BENIGN NEOPLASM OF CONNECTIVE AND OTHER SOFT TISSUE OF LEFT UPPER LIMB, INCLUDING SHOULDER: ICD-10-CM

## 2023-01-27 LAB
ALBUMIN SERPL ELPH-MCNC: 4 G/DL — SIGNIFICANT CHANGE UP (ref 3.3–5)
ALP SERPL-CCNC: 153 U/L — HIGH (ref 30–120)
ALT FLD-CCNC: 80 U/L DA — HIGH (ref 10–60)
ANION GAP SERPL CALC-SCNC: 6 MMOL/L — SIGNIFICANT CHANGE UP (ref 5–17)
AST SERPL-CCNC: 42 U/L — HIGH (ref 10–40)
BILIRUB SERPL-MCNC: 0.8 MG/DL — SIGNIFICANT CHANGE UP (ref 0.2–1.2)
BUN SERPL-MCNC: 17 MG/DL — SIGNIFICANT CHANGE UP (ref 7–23)
CALCIUM SERPL-MCNC: 9.8 MG/DL — SIGNIFICANT CHANGE UP (ref 8.4–10.5)
CHLORIDE SERPL-SCNC: 103 MMOL/L — SIGNIFICANT CHANGE UP (ref 96–108)
CO2 SERPL-SCNC: 29 MMOL/L — SIGNIFICANT CHANGE UP (ref 22–31)
CREAT SERPL-MCNC: 0.89 MG/DL — SIGNIFICANT CHANGE UP (ref 0.5–1.3)
EGFR: 78 ML/MIN/1.73M2 — SIGNIFICANT CHANGE UP
GLUCOSE SERPL-MCNC: 93 MG/DL — SIGNIFICANT CHANGE UP (ref 70–99)
HCT VFR BLD CALC: 40.4 % — SIGNIFICANT CHANGE UP (ref 34.5–45)
HGB BLD-MCNC: 13.7 G/DL — SIGNIFICANT CHANGE UP (ref 11.5–15.5)
MCHC RBC-ENTMCNC: 30.8 PG — SIGNIFICANT CHANGE UP (ref 27–34)
MCHC RBC-ENTMCNC: 33.9 GM/DL — SIGNIFICANT CHANGE UP (ref 32–36)
MCV RBC AUTO: 90.8 FL — SIGNIFICANT CHANGE UP (ref 80–100)
NRBC # BLD: 0 /100 WBCS — SIGNIFICANT CHANGE UP (ref 0–0)
PLATELET # BLD AUTO: 147 K/UL — LOW (ref 150–400)
POTASSIUM SERPL-MCNC: 4.2 MMOL/L — SIGNIFICANT CHANGE UP (ref 3.5–5.3)
POTASSIUM SERPL-SCNC: 4.2 MMOL/L — SIGNIFICANT CHANGE UP (ref 3.5–5.3)
PROT SERPL-MCNC: 8.3 G/DL — SIGNIFICANT CHANGE UP (ref 6–8.3)
RBC # BLD: 4.45 M/UL — SIGNIFICANT CHANGE UP (ref 3.8–5.2)
RBC # FLD: 12.1 % — SIGNIFICANT CHANGE UP (ref 10.3–14.5)
SODIUM SERPL-SCNC: 138 MMOL/L — SIGNIFICANT CHANGE UP (ref 135–145)
WBC # BLD: 4.8 K/UL — SIGNIFICANT CHANGE UP (ref 3.8–10.5)
WBC # FLD AUTO: 4.8 K/UL — SIGNIFICANT CHANGE UP (ref 3.8–10.5)

## 2023-01-27 PROCEDURE — 36415 COLL VENOUS BLD VENIPUNCTURE: CPT

## 2023-01-27 PROCEDURE — 93005 ELECTROCARDIOGRAM TRACING: CPT

## 2023-01-27 PROCEDURE — 80053 COMPREHEN METABOLIC PANEL: CPT

## 2023-01-27 PROCEDURE — 85027 COMPLETE CBC AUTOMATED: CPT

## 2023-01-27 PROCEDURE — 93010 ELECTROCARDIOGRAM REPORT: CPT

## 2023-01-27 PROCEDURE — G0463: CPT

## 2023-01-27 NOTE — H&P PST ADULT - NSICDXPASTMEDICALHX_GEN_ALL_CORE_FT
PAST MEDICAL HISTORY:  2019 novel coronavirus disease (COVID-19)     Class 1 obesity with body mass index (BMI) of 34.0 to 34.9 in adult     Elevated liver enzymes     H/O hepatitis     Hypothyroid     Mass of arm, left     KENTON (obstructive sleep apnea)

## 2023-01-27 NOTE — H&P PST ADULT - PROBLEM SELECTOR PLAN 1
Excision LEFT Forearm Mass on 02/16/2023  Medical Clearance Dr. Solorio  NPO as per Anesthesia- Synthroid on AM of surgery with sips of water.  Instructions reviewed and questions addressed  Covid19 screening 2/14/23 @ 8am

## 2023-01-27 NOTE — H&P PST ADULT - SKIN COMMENTS
Message left with pt's dtr, Pamela- [phone] to arrange lab appt this week.     Another lab appt and appt with Dr. Jules to be scheduled 3 weeks after this week's lab appt.     Please fu with pt's dtr to arrange appts.      approx 3cm annular soft fixed lesion on left forearm- tender on palpation

## 2023-01-27 NOTE — H&P PST ADULT - HISTORY OF PRESENT ILLNESS
51yo right hand dominant female patient with approximately 3yr history of progressively enlarging mass on left forearm. She states that she has pain when it is palpated and at times the pain radiates to her left wrist. She rates the pain when it occurs at 7/10. She is not taking anything for pain. Excision has been recommended and she presents today for PSTs.

## 2023-01-30 ENCOUNTER — OUTPATIENT (OUTPATIENT)
Dept: OUTPATIENT SERVICES | Facility: HOSPITAL | Age: 53
LOS: 1 days | End: 2023-01-30
Payer: COMMERCIAL

## 2023-01-30 ENCOUNTER — APPOINTMENT (OUTPATIENT)
Dept: MRI IMAGING | Facility: CLINIC | Age: 53
End: 2023-01-30
Payer: COMMERCIAL

## 2023-01-30 ENCOUNTER — APPOINTMENT (OUTPATIENT)
Dept: ENDOCRINOLOGY | Facility: CLINIC | Age: 53
End: 2023-01-30

## 2023-01-30 DIAGNOSIS — Z98.890 OTHER SPECIFIED POSTPROCEDURAL STATES: Chronic | ICD-10-CM

## 2023-01-30 DIAGNOSIS — R74.8 ABNORMAL LEVELS OF OTHER SERUM ENZYMES: ICD-10-CM

## 2023-01-30 DIAGNOSIS — Z98.51 TUBAL LIGATION STATUS: Chronic | ICD-10-CM

## 2023-01-30 PROBLEM — R22.32 LOCALIZED SWELLING, MASS AND LUMP, LEFT UPPER LIMB: Chronic | Status: ACTIVE | Noted: 2023-01-27

## 2023-01-30 PROBLEM — U07.1 COVID-19: Chronic | Status: ACTIVE | Noted: 2023-01-27

## 2023-01-30 PROBLEM — E03.9 HYPOTHYROIDISM, UNSPECIFIED: Chronic | Status: ACTIVE | Noted: 2023-01-27

## 2023-01-30 PROBLEM — G47.33 OBSTRUCTIVE SLEEP APNEA (ADULT) (PEDIATRIC): Chronic | Status: ACTIVE | Noted: 2023-01-27

## 2023-01-30 PROBLEM — Z86.19 PERSONAL HISTORY OF OTHER INFECTIOUS AND PARASITIC DISEASES: Chronic | Status: ACTIVE | Noted: 2023-01-27

## 2023-01-30 PROBLEM — E66.9 OBESITY, UNSPECIFIED: Chronic | Status: ACTIVE | Noted: 2023-01-27

## 2023-01-30 PROCEDURE — A9585: CPT

## 2023-01-30 PROCEDURE — 74183 MRI ABD W/O CNTR FLWD CNTR: CPT | Mod: 26

## 2023-01-30 PROCEDURE — 74183 MRI ABD W/O CNTR FLWD CNTR: CPT

## 2023-01-30 RX ORDER — TIRZEPATIDE 2.5 MG/.5ML
2.5 INJECTION, SOLUTION SUBCUTANEOUS
Qty: 1 | Refills: 5 | Status: COMPLETED | COMMUNITY
Start: 2023-01-26 | End: 2023-01-30

## 2023-02-01 RX ORDER — CHLORHEXIDINE GLUCONATE 213 G/1000ML
1 SOLUTION TOPICAL DAILY
Refills: 0 | Status: DISCONTINUED | OUTPATIENT
Start: 2023-02-16 | End: 2023-03-02

## 2023-02-13 ENCOUNTER — APPOINTMENT (OUTPATIENT)
Dept: OBGYN | Facility: CLINIC | Age: 53
End: 2023-02-13

## 2023-02-15 ENCOUNTER — TRANSCRIPTION ENCOUNTER (OUTPATIENT)
Age: 53
End: 2023-02-15

## 2023-02-15 ENCOUNTER — OUTPATIENT (OUTPATIENT)
Dept: OUTPATIENT SERVICES | Facility: HOSPITAL | Age: 53
LOS: 1 days | End: 2023-02-15
Payer: COMMERCIAL

## 2023-02-15 DIAGNOSIS — Z98.51 TUBAL LIGATION STATUS: Chronic | ICD-10-CM

## 2023-02-15 DIAGNOSIS — Z20.818 CONTACT WITH AND (SUSPECTED) EXPOSURE TO OTHER BACTERIAL COMMUNICABLE DISEASES: ICD-10-CM

## 2023-02-15 DIAGNOSIS — Z98.890 OTHER SPECIFIED POSTPROCEDURAL STATES: Chronic | ICD-10-CM

## 2023-02-15 LAB — SARS-COV-2 RNA SPEC QL NAA+PROBE: SIGNIFICANT CHANGE UP

## 2023-02-15 PROCEDURE — 87635 SARS-COV-2 COVID-19 AMP PRB: CPT

## 2023-02-16 ENCOUNTER — OUTPATIENT (OUTPATIENT)
Dept: OUTPATIENT SERVICES | Facility: HOSPITAL | Age: 53
LOS: 1 days | End: 2023-02-16
Payer: COMMERCIAL

## 2023-02-16 ENCOUNTER — TRANSCRIPTION ENCOUNTER (OUTPATIENT)
Age: 53
End: 2023-02-16

## 2023-02-16 VITALS
RESPIRATION RATE: 18 BRPM | HEART RATE: 66 BPM | OXYGEN SATURATION: 98 % | SYSTOLIC BLOOD PRESSURE: 106 MMHG | DIASTOLIC BLOOD PRESSURE: 67 MMHG

## 2023-02-16 VITALS
SYSTOLIC BLOOD PRESSURE: 100 MMHG | DIASTOLIC BLOOD PRESSURE: 63 MMHG | HEART RATE: 79 BPM | HEIGHT: 60 IN | OXYGEN SATURATION: 100 % | TEMPERATURE: 98 F | WEIGHT: 172.4 LBS | RESPIRATION RATE: 18 BRPM

## 2023-02-16 DIAGNOSIS — D21.22 BENIGN NEOPLASM OF CONNECTIVE AND OTHER SOFT TISSUE OF LEFT LOWER LIMB, INCLUDING HIP: ICD-10-CM

## 2023-02-16 DIAGNOSIS — D17.22 BENIGN LIPOMATOUS NEOPLASM OF SKIN AND SUBCUTANEOUS TISSUE OF LEFT ARM: ICD-10-CM

## 2023-02-16 DIAGNOSIS — Z01.818 ENCOUNTER FOR OTHER PREPROCEDURAL EXAMINATION: ICD-10-CM

## 2023-02-16 DIAGNOSIS — Z98.890 OTHER SPECIFIED POSTPROCEDURAL STATES: Chronic | ICD-10-CM

## 2023-02-16 DIAGNOSIS — Z98.51 TUBAL LIGATION STATUS: Chronic | ICD-10-CM

## 2023-02-16 LAB
ABO RH CONFIRMATION: SIGNIFICANT CHANGE UP
BLD GP AB SCN SERPL QL: SIGNIFICANT CHANGE UP

## 2023-02-16 PROCEDURE — 88304 TISSUE EXAM BY PATHOLOGIST: CPT

## 2023-02-16 PROCEDURE — 86901 BLOOD TYPING SEROLOGIC RH(D): CPT

## 2023-02-16 PROCEDURE — 47562 LAPAROSCOPIC CHOLECYSTECTOMY: CPT

## 2023-02-16 PROCEDURE — 88305 TISSUE EXAM BY PATHOLOGIST: CPT | Mod: 26

## 2023-02-16 PROCEDURE — 88305 TISSUE EXAM BY PATHOLOGIST: CPT

## 2023-02-16 PROCEDURE — 86900 BLOOD TYPING SEROLOGIC ABO: CPT

## 2023-02-16 PROCEDURE — C1889: CPT

## 2023-02-16 PROCEDURE — 88304 TISSUE EXAM BY PATHOLOGIST: CPT | Mod: 26

## 2023-02-16 PROCEDURE — 86850 RBC ANTIBODY SCREEN: CPT

## 2023-02-16 PROCEDURE — 36415 COLL VENOUS BLD VENIPUNCTURE: CPT

## 2023-02-16 DEVICE — CLIP APPLIER COVIDIEN ENDOCLIP II 10MM MED/LG: Type: IMPLANTABLE DEVICE | Status: FUNCTIONAL

## 2023-02-16 RX ORDER — HYDROMORPHONE HYDROCHLORIDE 2 MG/ML
0.5 INJECTION INTRAMUSCULAR; INTRAVENOUS; SUBCUTANEOUS
Refills: 0 | Status: DISCONTINUED | OUTPATIENT
Start: 2023-02-16 | End: 2023-02-16

## 2023-02-16 RX ORDER — CEFOTETAN DISODIUM 1 G
2 VIAL (EA) INJECTION ONCE
Refills: 0 | Status: COMPLETED | OUTPATIENT
Start: 2023-02-16 | End: 2023-02-16

## 2023-02-16 RX ORDER — SODIUM CHLORIDE 9 MG/ML
1000 INJECTION, SOLUTION INTRAVENOUS
Refills: 0 | Status: DISCONTINUED | OUTPATIENT
Start: 2023-02-16 | End: 2023-03-02

## 2023-02-16 RX ORDER — HYDROMORPHONE HYDROCHLORIDE 2 MG/ML
0.25 INJECTION INTRAMUSCULAR; INTRAVENOUS; SUBCUTANEOUS
Refills: 0 | Status: DISCONTINUED | OUTPATIENT
Start: 2023-02-16 | End: 2023-02-16

## 2023-02-16 RX ORDER — CEFAZOLIN SODIUM 1 G
2000 VIAL (EA) INJECTION ONCE
Refills: 0 | Status: DISCONTINUED | OUTPATIENT
Start: 2023-02-16 | End: 2023-02-16

## 2023-02-16 RX ORDER — OXYCODONE AND ACETAMINOPHEN 5; 325 MG/1; MG/1
1 TABLET ORAL ONCE
Refills: 0 | Status: DISCONTINUED | OUTPATIENT
Start: 2023-02-16 | End: 2023-02-16

## 2023-02-16 RX ORDER — OXYCODONE HYDROCHLORIDE 5 MG/1
1 TABLET ORAL
Qty: 12 | Refills: 0
Start: 2023-02-16 | End: 2023-02-18

## 2023-02-16 RX ORDER — ONDANSETRON 8 MG/1
4 TABLET, FILM COATED ORAL ONCE
Refills: 0 | Status: DISCONTINUED | OUTPATIENT
Start: 2023-02-16 | End: 2023-03-02

## 2023-02-16 RX ADMIN — HYDROMORPHONE HYDROCHLORIDE 0.5 MILLIGRAM(S): 2 INJECTION INTRAMUSCULAR; INTRAVENOUS; SUBCUTANEOUS at 10:32

## 2023-02-16 RX ADMIN — CHLORHEXIDINE GLUCONATE 1 APPLICATION(S): 213 SOLUTION TOPICAL at 08:03

## 2023-02-16 RX ADMIN — HYDROMORPHONE HYDROCHLORIDE 0.5 MILLIGRAM(S): 2 INJECTION INTRAMUSCULAR; INTRAVENOUS; SUBCUTANEOUS at 13:09

## 2023-02-16 RX ADMIN — SODIUM CHLORIDE 75 MILLILITER(S): 9 INJECTION, SOLUTION INTRAVENOUS at 10:28

## 2023-02-16 NOTE — ASU DISCHARGE PLAN (ADULT/PEDIATRIC) - CALL YOUR DOCTOR IF YOU HAVE ANY OF THE FOLLOWING:
Bleeding that does not stop/Swelling that gets worse/Pain not relieved by Medications/Fever greater than (need to indicate Fahrenheit or Celsius) Bleeding that does not stop/Swelling that gets worse/Pain not relieved by Medications/Fever greater than (need to indicate Fahrenheit or Celsius)/Wound/Surgical Site with redness, or foul smelling discharge or pus/Increased irritability or sluggishness

## 2023-02-16 NOTE — BRIEF OPERATIVE NOTE - OPERATION/FINDINGS
Laparoscopic Cholecystectomy performed via 4-port incision.   Critical View obtained.   Cystic Duct and Cystic artery clipped.  Incisions closed with 4-0 sutures, covered with steri-strips Laparoscopic Cholecystectomy performed via 4-port incision.   Critical View obtained.   Cystic Duct and Cystic artery clipped.  Incisions closed with 4-0 sutures, covered with steri-strips    consistent with uncomplicated excision of left forearm lipoma

## 2023-02-16 NOTE — BRIEF OPERATIVE NOTE - COMMENTS
AZAM Escobar PA-C provided direct first assist support to the surgeon during this surgical procedure. My involvement included positioning, prepping and draping the patient prior to surgery, ensuring clear visibility and exposure for the surgeon by using instruments such as retractors and suction, closing surgical incisions and dressing wounds. As well as other tasks as directed by the surgeon.

## 2023-02-16 NOTE — ASU PATIENT PROFILE, ADULT - FALL HARM RISK - UNIVERSAL INTERVENTIONS
Bed in lowest position, wheels locked, appropriate side rails in place/Call bell, personal items and telephone in reach/Instruct patient to call for assistance before getting out of bed or chair/Non-slip footwear when patient is out of bed/[place] to call system/Physically safe environment - no spills, clutter or unnecessary equipment/Purposeful Proactive Rounding/Room/bathroom lighting operational, light cord in reach

## 2023-02-16 NOTE — ASU DISCHARGE PLAN (ADULT/PEDIATRIC) - NS MD DC FALL RISK RISK
For information on Fall & Injury Prevention, visit: [email or web address] OR  [email or web address] OR  https://www.cdc.gov/steadi/patient.html

## 2023-02-16 NOTE — ASU PATIENT PROFILE, ADULT - AS SC BRADEN MOBILITY
This is a 89 y/o F with hx RA on chronic prednisone, HTN, CKD (baseline Cr ~1.1), ILD, DM2, wheelchairbound, p/w episode of confusion, poss loss of consciousness. History was obtained from pt and pt's  over phone as per MD documentation. Pt reports 1 week of coughs. Otherwise in usual state of health, until this morning, when pt stated she was not feeling well. Pt had half a bagel, then, while transferring from bed to wheelchair, had an episode of poss loss of consciousness, shaking of her head, eyes rolling back in head, fall. Pt was reportedly unconscious for only a few seconds, then came back. Pt denies losing consciousness at this point, but unable to give a good history as to what happened. Pt has not taking any basaglar at least for the past couple days, as her evening sugars have been in the 90s. Pt has only (4) no limitation

## 2023-02-16 NOTE — ASU DISCHARGE PLAN (ADULT/PEDIATRIC) - CARE PROVIDER_API CALL
Brooklyn Corrales (MD)  ColonRectal Surgery; Surgery  [address] [facility] [address]  [place] [address]  Phone: [phone]  Fax: [phone]  Follow Up Time: 2 weeks

## 2023-02-16 NOTE — ASU PATIENT PROFILE, ADULT - CAREGIVER NAME
Data Source: Patient, [facility] record. 9/3/2020    2:14 PM    Abelardo Beth [id number]    70 y.o. Patient Encounter: [address] Visit    Cancer Diagnosis:  Lung mass  Stage:  Likely 4  Performance Status:  1  Code Status:  Not discussed  Onc History (Copied from prior):   70 male, retired from [employer], ex-smoker (1 pack/day x 40 years, quit in 2017) history of CAD, CHF, CKD (baseline Cr 1.9), PAD, r right common femoral embolectomy, status post multiple LE arterial stents on Plavix, appendectomy, sickle cell trait, right kidney clear cell carcinoma pT1pNX, measuring 5.1 x 4.6 cm status post right nephrectomy 12/17 with negative margins and simply monitor thereafter. More recently patient seen by primary care with shortness of breath, initially tested for COVID 19 which was negative. Further imaging led to a chest x-ray which was abnormal, this was followed by contrasted CT chest 7/16/2020 showing a spiculated nodular opacity in CHRIS measuring 4.5 x 2.4 cm, also note made of prominent mediastinal lymph nodes including a subcarinal lymph node measuring 12 mm. He is now referred to oncology for further work-up and management. Reports coughing has been ongoing for over a year but it has gotten worse. Reports coughs up small amounts of clear phlegm. Hospice Referral:  NA  Interval History:  9/3/20: Pt seen in f/u. Denies worsening SOB. Sating well on RA (in 90s). Reports cough about the same. Recently case was discussed at [facility]: felt to have metastatic lung adenocarcinoma, not sufficient tissue for Caris NGS, repeat EBUS biopsy scheduled for 9/9/20. Will plan for systemic therapy initiation (Carbo/Pemetrexed/Pembrolizumab) thereafter on 9/10/20 while awaiting results of NGS. NCCN Distress Score:  0  REVIEW OF SYSTEMS:  As mentioned above, all other systems were reviewed in full and are negative.     Past Medical History:   Diagnosis Date    Cancer [facility]) kidney ca    Cardiomyopathy [facility])     CHF (congestive heart failure) ([facility] 11/21/2017    55-60% 4/2019    Chronic kidney disease     CKD (chronic kidney disease), stage III (HCC)     Stage 3    Coronary artery disease involving native coronary artery of native heart without angina pectoris 10/25/2017    followed [facility] last cath 10/16/2017 no stents    Diabetes ([facility]     abg fbs 115 last A1c 7.3 1/20/2020    Foot drop, left     Former cigarette smoker     History of embolectomy 07/2019    right common femoral embolectomy    History of kidney cancer 12/2017    kidney- right removed    Long term current use of anticoagulant     Plavix    Sickle cell trait (HCC)     Sickle cell trait    Status post carotid endarterectomy 07/2019    Right side    Thyroid disease        Past Surgical History:   Procedure Laterality Date    HX APPENDECTOMY  1963    HX CAROTID ENDARTERECTOMY      HX NEPHRECTOMY Right 12/2017    IR INSERT TUNL CVC W PORT OVER 5 YEARS  8/31/2020    NC LEFT HEART CATH,PERCUTANEOUS  10/16/2017    no PCI- medical management    VASCULAR SURGERY PROCEDURE UNLIST Bilateral 10/11/2018    iliac angio and stent    VASCULAR SURGERY PROCEDURE UNLIST  05/13/2019    Right lower extremity arteriogram with balloon angioplasty and stent of the right external iliac artery with 8 x 4 S. M.A.R.T. stent.  VASCULAR SURGERY PROCEDURE UNLIST Right 06/20/2019     Right common femoral artery endarterectomy,Right common femoral embolectomy,Right lower extremity arteriogram       Current Outpatient Medications   Medication Sig Dispense Refill    prochlorperazine (Compazine) 10 mg tablet Take 1 Tab by mouth every six (6) hours as needed for Nausea. Indications: nausea and vomiting caused by cancer drugs, nausea and vomiting 90 Tab 3    ondansetron hcl (Zofran) 8 mg tablet Take 1 Tab by mouth every eight (8) hours as needed for Nausea.  Indications: nausea and vomiting caused by cancer drugs 60 Tab 3    lidocaine-prilocaine (EMLA) topical cream Apply  to affected area as needed for Pain. 30 g 1    valsartan (DIOVAN) 80 mg tablet TAKE 1 TABLET BY MOUTH DAILY (Patient taking differently: nightly.) 30 Tab 0    Olamide Pen Needle 32 gauge x 5/32\" ndle use once daily 100 Pen Needle 11    dapagliflozin (Farxiga) 10 mg tab tablet Take 1 Tab by mouth daily. 90 Tab 1    Unithroid 50 mcg tablet Take 1 Tab by mouth Daily (before breakfast). 90 Tab 1    carvediloL (COREG) 12.5 mg tablet Take 1 Tab by mouth two (2) times daily (with meals). 180 Tab 1    atorvastatin (LIPITOR) 80 mg tablet TAKE 1 TABLET BY MOUTH EVERY NIGHT 90 Tab 3    insulin degludec [employer]unes FlexTouch U-100) 100 unit/mL (3 mL) inpn 20 units sq daily  Indications: type 2 diabetes mellitus (Patient taking differently: 20 Units nightly. 20 units sq daily  Indications: type 2 diabetes mellitus) 6 Pen 1    clopidogreL (PLAVIX) 75 mg tab Take 1 Tab by mouth daily.  90 Tab 3    Victoza 3-Denis 0.6 mg/0.1 mL (18 mg/3 mL) pnij ADMINISTER 1.8 MG UNDER THE SKIN DAILY 9 mL 3    ONETOUCH ULTRA BLUE TEST STRIP strip   1       Social History     Socioeconomic History    Marital status: LEGALLY      Spouse name: Not on file    Number of children: Not on file    Years of education: Not on file    Highest education level: Not on file   Tobacco Use    Smoking status: Former Smoker     Packs/day: 1.00     Years: 40.00     Pack years: 40.00     Last attempt to quit: 2017     Years since quitting: 3.6    Smokeless tobacco: Never Used   Substance and Sexual Activity    Alcohol use: Not Currently    Drug use: Not Currently     Comment: none since 2017       Family History   Problem Relation Age of Onset    Diabetes Other     Cancer Mother         Ovarian CA    Heart Disease Mother     Hypertension Mother     Sickle Cell Trait Mother     Heart Disease Father     Sickle Cell Trait Father     Sickle Cell Anemia Brother     Heart Disease Brother    [facility] Hypertension Brother     Cancer Cousin         prostate       Allergies   Allergen Reactions    Aspirin Nausea and Vomiting       PHYSICAL EXAMINATION:  General Appearance: Healthy appearing patient in no acute distress  Vitals reviewed. Visit Vitals  /76 (BP 1 Location: Left arm, BP Patient Position: Sitting)   Pulse 95   Temp 97.8 °F (36.6 °C) (Oral)   Resp 16   Wt 137 lb 4.8 oz (62.3 kg)   SpO2 97%   BMI 25.11 kg/m²     HEENT: No oral or pharyngeal masses, ulceration or thrush noted, no sinus tenderness. Neck is supple with no thyromegaly or JVD noted. Lymph Nodes: No lymphadenopathy noted in the occipital, pre and post auricular, cervical, supra and infraclavicular, axillary, epitrochlear, inguinal, and popliteal region. Breasts: No palpable masses, nipple discharge or skin retraction  Lungs/Thorax: Clear to auscultation, no accessory muscles of respiration being used. Heart: Regular rate and rhythm, normal S1, S2, no appreciable murmurs, rubs, gallops  Abdomen: Soft, nontender, bowel sounds present, no appreciable hepatosplenomegaly, no palpable masses  Extremeties: Good pulses bilaterally, no peripheral edema. Skin: Normal skin tone with no rash, petechiae, ecchymosis noted.   Musculoskeletal: No pain on palpation over bony prominence, no edema, no evidence of gout, no joint or bony deformity  Neurologic: Grossly intact    LABS/IMAGING:    Lab Results   Component Value Date/Time    WBC 5.6 08/31/2020 07:49 AM    HGB 11.2 (L) 08/31/2020 07:49 AM    HCT 34.7 (L) 08/31/2020 07:49 AM    PLATELET 478 12/81/6620 07:49 AM    MCV 79.6 08/31/2020 07:49 AM       Lab Results   Component Value Date/Time    Sodium 142 08/31/2020 07:49 AM    Potassium 4.4 08/31/2020 07:49 AM    Chloride 113 (H) 08/31/2020 07:49 AM    CO2 22 08/31/2020 07:49 AM    Anion gap 7 08/31/2020 07:49 AM    Glucose 141 (H) 08/31/2020 07:49 AM    BUN 37 (H) 08/31/2020 07:49 AM    Creatinine 1.65 (H) 08/31/2020 07:49 AM    BUN/Creatinine ratio 17 01/20/2020 10:57 AM    GFR est AA 53 (L) 08/31/2020 07:49 AM    GFR est non-AA 44 (L) 08/31/2020 07:49 AM    Calcium 8.7 08/31/2020 07:49 AM    Alk. phosphatase 74 07/23/2020 03:14 PM    Protein, total 8.0 07/23/2020 03:14 PM    Albumin 3.3 07/23/2020 03:14 PM    Globulin 4.7 (H) 07/23/2020 03:14 PM    A-G Ratio 0.7 (L) 07/23/2020 03:14 PM    ALT (SGPT) 32 07/23/2020 03:14 PM         Above results reviewed with patient. ASSESSMENT:  70 male, retired from [employer], ex-smoker (1 pack/day x 40 years, quit in 2017) history of CAD, CHF, CKD (baseline Cr 1.9), PAD, RT common femoral embolectomy, status post multiple LE arterial stents on Plavix, appendectomy, sickle cell trait, right kidney clear cell carcinoma pT1pNX, measuring 5.1 x 4.6 cm status post right nephrectomy 12/17 with negative margins and simply monitor thereafter. More recently patient seen by primary care with shortness of breath, initially tested for COVID 19 which was negative. Further imaging led to a chest x-ray which was abnormal, this was followed by contrasted CT chest 7/16/2020 showing a spiculated nodular opacity in CHRIS measuring 4.5 x 2.4 cm, also note made of prominent mediastinal lymph nodes including a subcarinal lymph node measuring 12 mm. He is now referred to oncology for further work-up and management. Reports coughing has been ongoing for over a year but it has gotten worse. Reports coughs up small amounts of clear phlegm. Obvious concern is for an underlying malignant process, either recurrence of his kidney cancer versus new lung primary. Imaging personally reviewed, mass itself is mostly linear along left major fissure. Will investigate further as below. Not unreasonable to consider trial of antibiotic, will proceed with levaquin 500 mg daily x 5 days. 8/25/2020: Continues to report ongoing cough with clear phlegm. Denies any shortness of breath. Recent brain MRI negative.   Since last visit underwent PET CT scan consistent with uptake in lung mass, mediastinal LAD including contralateral side, as well as right adrenal lesion uptake consistent with metastatic disease. He is status post EBUS/endobronchial biopsy of 11R with prelim results showing carcinoma. Per report RUL as well as CHRIS endobronchial tumor invasion seen. Case discussed with pathology, likely non-small cell lung cancer though additional immunostains being sent to rule out kidney cancer. Will also send for Carris NGS thereafter. Case is to be discussed at [facility] 9/2/2020. Will likely plan for systemic therapy thereafter (if non-small lung cancer, then will plan for carbo/pemetrexed plus pembrolizumab while awaiting Carris NGS). Port placement in such a case. Navigation following. RTC within 2 weeks with above, repeat labs. 9/3/20: Pt seen in f/u. Denies worsening SOB. Sating well on RA (in 90s). Reports cough about the same. Recently case was discussed at [facility]: felt to have metastatic lung adenocarcinoma, not sufficient tissue for Caris NGS, repeat EBUS biopsy scheduled for 9/9/20. Will plan for systemic therapy initiation (Carbo/Pemetrexed/Pembrolizumab) thereafter on 9/10/20 while awaiting results of NGS. RTC in 1 week (9/10/20 for C1) and in 2 weeks (tox check) w NP, and in 4 weeks w MD w/ labs. 1. Likely NSCLC, await final path, adrenal uptake concerning for stage 4 disease  2. H/o kidney cancer    PLAN:  - As above. Plan for repeat brnchoscopy w EBUS 9/9/20 for additional tissue for NGS testing. Thereafter start Carbo/Pemetrexed (dose reduced) plus pembro  while awaiting NGS results. - Check tissue for Caris NGS    RTC in 1 week (9/10/20 for C1) and in 2 weeks (tox check) w NP, and in 4 weeks w MD w/ labs. Thank you MERNA Escalante for allowing us to paticipate in care of this pleasant patient.  Please call w/ any [place] MD  [address] [facility]  [address]  Office : [phone]  Fax : [phone] ellie

## 2023-02-16 NOTE — ASU DISCHARGE PLAN (ADULT/PEDIATRIC) - ASU DC SPECIAL INSTRUCTIONSFT
- Drink plenty of fluids and rest as needed.  - Leave steri strips in place  - Ice for swelling  - No lifting >20 lbs for 2 weeks  - Please call the office at [phone] to schedule a post-op appointment for 7-10 days

## 2023-03-08 RX ORDER — LIRAGLUTIDE 6 MG/ML
18 INJECTION, SOLUTION SUBCUTANEOUS
Qty: 3 | Refills: 3 | Status: COMPLETED | COMMUNITY
Start: 2023-01-30 | End: 2023-03-08

## 2023-06-07 DIAGNOSIS — Z01.818 ENCOUNTER FOR OTHER PREPROCEDURAL EXAMINATION: ICD-10-CM

## 2023-06-09 ENCOUNTER — APPOINTMENT (OUTPATIENT)
Dept: SURGERY | Facility: CLINIC | Age: 53
End: 2023-06-09
Payer: COMMERCIAL

## 2023-06-09 VITALS
OXYGEN SATURATION: 95 % | HEIGHT: 60 IN | TEMPERATURE: 96.7 F | WEIGHT: 171.31 LBS | HEART RATE: 78 BPM | SYSTOLIC BLOOD PRESSURE: 115 MMHG | RESPIRATION RATE: 18 BRPM | BODY MASS INDEX: 33.63 KG/M2 | DIASTOLIC BLOOD PRESSURE: 78 MMHG

## 2023-06-09 DIAGNOSIS — K76.0 FATTY (CHANGE OF) LIVER, NOT ELSEWHERE CLASSIFIED: ICD-10-CM

## 2023-06-09 PROCEDURE — 99205 OFFICE O/P NEW HI 60 MIN: CPT

## 2023-06-09 NOTE — ASSESSMENT
[FreeTextEntry1] : 53-year-old female 5 feet tall 196 pounds with a BMI of 33 with comorbid conditions of sleep apnea treated with CPAP machine she would like to be considered for sleeve gastrectomy I believe she would be an excellent candidate she will undergo a usual medical nutritional and psychological work-up attend a preoperative support group meeting and return for second office visit once all these are completed the risks benefits and expectations of the procedure were discussed at length with the patient all of her questions were answered

## 2023-06-09 NOTE — HISTORY OF PRESENT ILLNESS
[de-identified] : Patient is a 53-year-old female that presents for initial bariatric surgery consultation.  83-year-old female 5 feet tall 196 pounds with a BMI of 33 with comorbid conditions of sleep apnea treated with CPAP machine she has been heavy most of her adult life she has been on multiple diet programs in the past losing up to 1010 pounds at any 1 time always gaining that weight back she is looking for more permanent solution to her weight loss problem she is interested in a sleeve gastrectomy

## 2023-06-19 ENCOUNTER — APPOINTMENT (OUTPATIENT)
Dept: OBGYN | Facility: CLINIC | Age: 53
End: 2023-06-19
Payer: COMMERCIAL

## 2023-06-19 VITALS
DIASTOLIC BLOOD PRESSURE: 87 MMHG | HEIGHT: 60 IN | SYSTOLIC BLOOD PRESSURE: 122 MMHG | WEIGHT: 171 LBS | BODY MASS INDEX: 33.57 KG/M2

## 2023-06-19 DIAGNOSIS — E05.80 OTHER THYROTOXICOSIS W/OUT THYROTOXIC CRISIS OR STORM: ICD-10-CM

## 2023-06-19 DIAGNOSIS — Z01.419 ENCOUNTER FOR GYNECOLOGICAL EXAMINATION (GENERAL) (ROUTINE) W/OUT ABNORMAL FINDINGS: ICD-10-CM

## 2023-06-19 PROCEDURE — 99396 PREV VISIT EST AGE 40-64: CPT

## 2023-06-19 RX ORDER — SEMAGLUTIDE 0.25 MG/.5ML
0.25 INJECTION, SOLUTION SUBCUTANEOUS
Qty: 1 | Refills: 0 | Status: COMPLETED | COMMUNITY
Start: 2023-03-08 | End: 2023-06-19

## 2023-06-19 NOTE — HISTORY OF PRESENT ILLNESS
[Patient reported mammogram was normal] : Patient reported mammogram was normal [Patient reported breast sonogram was normal] : Patient reported breast sonogram was normal [Patient reported PAP Smear was normal] : Patient reported PAP Smear was normal [Patient reported colonoscopy was normal] : Patient reported colonoscopy was normal [FreeTextEntry1] : 54YO P2 LMP 2012 presents for checkup without complaints-Scheduled for sleeve gastrectomy. She continues to have elevated GGT and was told liver Bx shows hepatitis C, even though her serology for Hep C is (-). [Mammogramdate] : 1/23 [BreastSonogramDate] : 1/23 [PapSmeardate] : 12/21 [BoneDensityDate] : 2016 [TextBox_37] : Tfem neck=-1.4 [ColonoscopyDate] : 2021

## 2023-06-21 ENCOUNTER — LABORATORY RESULT (OUTPATIENT)
Age: 53
End: 2023-06-21

## 2023-06-21 ENCOUNTER — APPOINTMENT (OUTPATIENT)
Dept: CARDIOLOGY | Facility: CLINIC | Age: 53
End: 2023-06-21
Payer: COMMERCIAL

## 2023-06-21 ENCOUNTER — NON-APPOINTMENT (OUTPATIENT)
Age: 53
End: 2023-06-21

## 2023-06-21 VITALS
HEIGHT: 60 IN | SYSTOLIC BLOOD PRESSURE: 118 MMHG | TEMPERATURE: 98 F | HEART RATE: 68 BPM | BODY MASS INDEX: 34.16 KG/M2 | DIASTOLIC BLOOD PRESSURE: 72 MMHG | RESPIRATION RATE: 16 BRPM | OXYGEN SATURATION: 100 % | WEIGHT: 174 LBS

## 2023-06-21 PROCEDURE — 93000 ELECTROCARDIOGRAM COMPLETE: CPT | Mod: NC

## 2023-06-21 PROCEDURE — 99203 OFFICE O/P NEW LOW 30 MIN: CPT | Mod: 25

## 2023-06-21 NOTE — REASON FOR VISIT
[CV Risk Factors and Non-Cardiac Disease] : CV risk factors and non-cardiac disease [Hyperlipidemia] : hyperlipidemia [FreeTextEntry1] : This is a 53-year-old female with past medical history significant for HLD, hypothyroidism, KENTON not on CPAP, obesity, presents for cardio metabolic consultation and cardiovascular preoperative clearance for upcoming sleeve gastrectomy with Dr. Blevins.\par \par The patient is doing well today overall. She reports some occasional dyspnea on exertion, mainly with stairs. She denies chest pain, shortness of breath at rest, palpitations, dizziness, syncope, headaches, fatigue or lower extremity edema.\par \par The patient has history of HLD, never on lipid lower therapy. She has tried multiple diets and exercise program without significant weight reduction. \par \par She reports history of elevated liver enzymes currently followed by gastroenterologist Dr. Noble. She underwent cholecystectomy 2/16/2023. Denies alcohol use. \par \par The patient also has history of obstructive sleep apnea, not currently using her CPAP machine. \par \par Patient reports family history significant for mother with history of CVA at 67 y/o, CAD s/p stent and pacemaker. Father with history of HTN and esophageal cancer.\par \par She works in medical billing and lives at home with her  and two children. \par \par No history of smoking or rheumatic fever. Last COVID-19 infection 2021 and has had 3 COVID-19 vaccines.

## 2023-06-21 NOTE — PHYSICAL EXAM
[Well Developed] : well developed [Well Nourished] : well nourished [No Acute Distress] : no acute distress [Normal Conjunctiva] : normal conjunctiva [Normal Venous Pressure] : normal venous pressure [No Carotid Bruit] : no carotid bruit [Normal S1, S2] : normal S1, S2 [No Murmur] : no murmur [No Rub] : no rub [No Gallop] : no gallop [I] : a grade 1 [Clear Lung Fields] : clear lung fields [Good Air Entry] : good air entry [No Respiratory Distress] : no respiratory distress  [Soft] : abdomen soft [Non Tender] : non-tender [No Masses/organomegaly] : no masses/organomegaly [Normal Bowel Sounds] : normal bowel sounds [Normal Gait] : normal gait [No Edema] : no edema [No Cyanosis] : no cyanosis [No Clubbing] : no clubbing [No Varicosities] : no varicosities [No Rash] : no rash [No Skin Lesions] : no skin lesions [Moves all extremities] : moves all extremities [No Focal Deficits] : no focal deficits [Normal Speech] : normal speech [Alert and Oriented] : alert and oriented [Normal memory] : normal memory

## 2023-06-21 NOTE — DISCUSSION/SUMMARY
[FreeTextEntry1] : This is a 53-year-old female with past medical history significant for sleep apnea, hyperlipidemia, status post COVID-19 infection 2021, history of elevated liver function tests (followed by hepatologist), status postcholecystectomy, hypothyroidism, who comes in for cardiometabolic consultation and preoperative cardiac clearance prior to bariatric surgery.  The patient is planning sleeve gastrectomy in the next few months.\par She denies chest pain, shortness of breath, dizziness or syncope.  She may get occasional dyspnea on exertion with stairs.\par She has no history of rheumatic fever.  She does not drink excessive caffeine or alcohol.\par Cardiac risk factors include hyperlipidemia, obesity.\par Family history significant for mother with history of CVA at 65 y/o, CAD s/p stent and pacemaker. Father with history of HTN and esophageal cancer.\par She has tried multiple diets and exercise program without significant weight reduction. \par She reports history of elevated liver enzymes currently followed by gastroenterologist Dr. Noble. \par The patient has obstructive sleep apnea, not currently using her CPAP machine. \par Electrocardiogram done June 21, 2021 demonstrates normal sinus rhythm at a rate of 68 bpm is otherwise remarkable for juvenile T wave pattern.\par The patient does get occasional dyspnea on exertion.  She will schedule an exercise stress test to rule out significant coronary artery disease.\par She will schedule echo Doppler examination to rule out mitral valve prolapse, evaluate her left ventricular function, chamber size, rule out hypertrophy.\par The patient will have new blood work done today for lipid panel, electrolytes, and SMA-20.\par The patient will follow-up with me at the above noted diagnostic tests are completed.\par She is currently hemodynamically stable and asymptomatic from a cardiac standpoint.\par The patient understands that aerobic exercises must be increased to 40 minutes 4 times per week. A detailed discussion of lifestyle modification was done today. The patient has a good understanding of the diagnosis, and treatment plan. Lifestyle modification was also outlined.

## 2023-06-22 ENCOUNTER — APPOINTMENT (OUTPATIENT)
Dept: BARIATRICS | Facility: CLINIC | Age: 53
End: 2023-06-22

## 2023-06-22 ENCOUNTER — NON-APPOINTMENT (OUTPATIENT)
Age: 53
End: 2023-06-22

## 2023-06-22 LAB — HPV HIGH+LOW RISK DNA PNL CVX: NOT DETECTED

## 2023-06-26 LAB — CYTOLOGY CVX/VAG DOC THIN PREP: ABNORMAL

## 2023-07-13 ENCOUNTER — APPOINTMENT (OUTPATIENT)
Dept: RADIOLOGY | Facility: HOSPITAL | Age: 53
End: 2023-07-13

## 2023-07-31 ENCOUNTER — APPOINTMENT (OUTPATIENT)
Dept: SURGERY | Facility: HOSPITAL | Age: 53
End: 2023-07-31

## 2023-08-09 ENCOUNTER — APPOINTMENT (OUTPATIENT)
Dept: CARDIOLOGY | Facility: CLINIC | Age: 53
End: 2023-08-09
Payer: COMMERCIAL

## 2023-08-09 DIAGNOSIS — R06.09 OTHER FORMS OF DYSPNEA: ICD-10-CM

## 2023-08-09 PROCEDURE — 93306 TTE W/DOPPLER COMPLETE: CPT

## 2023-08-10 ENCOUNTER — APPOINTMENT (OUTPATIENT)
Dept: CARDIOLOGY | Facility: CLINIC | Age: 53
End: 2023-08-10
Payer: COMMERCIAL

## 2023-08-10 ENCOUNTER — OUTPATIENT (OUTPATIENT)
Dept: OUTPATIENT SERVICES | Facility: HOSPITAL | Age: 53
LOS: 1 days | End: 2023-08-10
Payer: COMMERCIAL

## 2023-08-10 VITALS
WEIGHT: 175 LBS | RESPIRATION RATE: 16 BRPM | BODY MASS INDEX: 34.36 KG/M2 | HEART RATE: 84 BPM | SYSTOLIC BLOOD PRESSURE: 125 MMHG | TEMPERATURE: 97.5 F | DIASTOLIC BLOOD PRESSURE: 78 MMHG | HEIGHT: 60 IN | OXYGEN SATURATION: 97 %

## 2023-08-10 DIAGNOSIS — Z98.890 OTHER SPECIFIED POSTPROCEDURAL STATES: Chronic | ICD-10-CM

## 2023-08-10 DIAGNOSIS — Z01.810 ENCOUNTER FOR PREPROCEDURAL CARDIOVASCULAR EXAMINATION: ICD-10-CM

## 2023-08-10 DIAGNOSIS — Z98.51 TUBAL LIGATION STATUS: Chronic | ICD-10-CM

## 2023-08-10 DIAGNOSIS — E66.01 MORBID (SEVERE) OBESITY DUE TO EXCESS CALORIES: ICD-10-CM

## 2023-08-10 DIAGNOSIS — E66.9 OBESITY, UNSPECIFIED: ICD-10-CM

## 2023-08-10 PROCEDURE — 74240 X-RAY XM UPR GI TRC 1CNTRST: CPT

## 2023-08-10 PROCEDURE — 99213 OFFICE O/P EST LOW 20 MIN: CPT | Mod: 25

## 2023-08-10 PROCEDURE — 93015 CV STRESS TEST SUPVJ I&R: CPT

## 2023-08-10 PROCEDURE — 74240 X-RAY XM UPR GI TRC 1CNTRST: CPT | Mod: 26

## 2023-08-10 NOTE — REASON FOR VISIT
[CV Risk Factors and Non-Cardiac Disease] : CV risk factors and non-cardiac disease [Hyperlipidemia] : hyperlipidemia [FreeTextEntry1] : This is a 53-year-old female with past medical history significant for HLD, hypothyroidism, K[place]ON not on CPAP, obesity, presents for cardiovascular preoperative clearance for upcoming sleeve gastrectomy with Dr. Blevins. The patient is doing well today overall. She reports some occasional dyspnea on exertion, mainly with stairs. She denies chest pain, shortness of breath at rest, palpitations, dizziness, syncope, headaches, fatigue or lower extremity edema. The patient has history of HLD, never on lipid lower therapy. She has tried multiple diets and exercise program without significant weight reduction.  She reports history of elevated liver enzymes currently followed by gastroenterologist Dr. Noble. She underwent cholecystectomy 2/16/2023. Denies alcohol use.  The patient also has history of obstructive sleep apnea, not currently using her CPAP machine.  Family history  significant for mother with history of CVA at 65 y/o, CAD s/p stent and pacemaker. Father with history of HTN and esophageal cancer.

## 2023-08-10 NOTE — DISCUSSION/SUMMARY
[FreeTextEntry1] : This is a 53-year-old female with past medical history significant for sleep apnea, hyperlipidemia, status post COVID-19 infection 2021, history of elevated liver function tests (followed by hepatologist), status postcholecystectomy, hypothyroidism, who comes in for preoperative cardiac clearance prior to bariatric surgery.  The patient is planning sleeve gastrectomy in the next few months. She denies chest pain, shortness of breath, dizziness or syncope.  She may get occasional dyspnea on exertion with stairs. She has no history of rheumatic fever.  She does not drink excessive caffeine or alcohol. Cardiac risk factors include hyperlipidemia, obesity. Family history significant for mother with history of CVA at 65 y/o, CAD s/p stent and pacemaker. Father with history of HTN and esophageal cancer. The patient had normal exercise stress test August 10, 2023. Echo Doppler examination done August 9, 2023 demonstrated normal left ventricular ejection fraction greater than 55%. Lipid panel done June 21, 2023 demonstrated cholesterol 180, HDL 47, triglycerides 360, LDL calculated 61, non-HDL cholesterol 133 mg/dL (of note the patient was not fasting for this examination.  Hemoglobin A1c was 5.2 LDL direct was 80 mg/dL. She has tried multiple diets and exercise program without significant weight reduction.  She reports history of elevated liver enzymes currently followed by gastroenterologist Dr. Noble.  The patient has obstructive sleep apnea, not currently using her CPAP machine.  Electrocardiogram done June 21, 2021 demonstrates normal sinus rhythm at a rate of 68 bpm is otherwise remarkable for juvenile T wave pattern. She is currently hemodynamically stable and asymptomatic from a cardiac standpoint.   This patient is cleared from a cardiac standpoint for bariatric surgery.  Please avoid overhydration.  Maintain prophylaxis for deep venous thrombosis.  The patient should have an incentive spirometer in the perioperative period.

## 2023-08-10 NOTE — PHYSICAL EXAM
[Well Developed] : well developed [Well Nourished] : well nourished [No Acute Distress] : no acute distress [Normal Conjunctiva] : normal conjunctiva [No Carotid Bruit] : no carotid bruit [Normal Venous Pressure] : normal venous pressure [Normal S1, S2] : normal S1, S2 [No Rub] : no rub [5th Left ICS - MCL] : palpated at the 5th LICS in the midclavicular line [Normal] : normal [No Precordial Heave] : no precordial heave was noted [Normal Rate] : normal [Rhythm Regular] : regular [Normal S1] : normal S1 [Normal S2] : normal S2 [No Gallop] : no gallop heard [S3] : no S3 [S4] : no S4 [I] : a grade 1 [No Pitting Edema] : no pitting edema present [Right Carotid Bruit] : no bruit heard over the right carotid [Left Carotid Bruit] : no bruit heard over the left carotid [Right Femoral Bruit] : no bruit heard over the right femoral artery [Left Femoral Bruit] : no bruit heard over the left femoral artery [2+] : left 2+ [No Abnormalities] : the abdominal aorta was not enlarged and no bruit was heard [Good Air Entry] : good air entry [Clear Lung Fields] : clear lung fields [No Respiratory Distress] : no respiratory distress  [Soft] : abdomen soft [Non Tender] : non-tender [No Masses/organomegaly] : no masses/organomegaly [Normal Bowel Sounds] : normal bowel sounds [Normal Gait] : normal gait [No Edema] : no edema [No Cyanosis] : no cyanosis [No Clubbing] : no clubbing [No Varicosities] : no varicosities [No Skin Lesions] : no skin lesions [No Rash] : no rash [Moves all extremities] : moves all extremities [No Focal Deficits] : no focal deficits [Normal Speech] : normal speech [Alert and Oriented] : alert and oriented [Normal memory] : normal memory

## 2023-08-15 ENCOUNTER — APPOINTMENT (OUTPATIENT)
Dept: PULMONOLOGY | Facility: CLINIC | Age: 53
End: 2023-08-15
Payer: COMMERCIAL

## 2023-08-15 ENCOUNTER — APPOINTMENT (OUTPATIENT)
Dept: PULMONOLOGY | Facility: CLINIC | Age: 53
End: 2023-08-15

## 2023-08-15 VITALS
SYSTOLIC BLOOD PRESSURE: 122 MMHG | BODY MASS INDEX: 34.36 KG/M2 | DIASTOLIC BLOOD PRESSURE: 70 MMHG | HEIGHT: 60 IN | RESPIRATION RATE: 16 BRPM | WEIGHT: 175 LBS | HEART RATE: 81 BPM | OXYGEN SATURATION: 97 %

## 2023-08-15 DIAGNOSIS — U07.1 COVID-19: ICD-10-CM

## 2023-08-15 DIAGNOSIS — Z82.49 FAMILY HISTORY OF ISCHEMIC HEART DISEASE AND OTHER DISEASES OF THE CIRCULATORY SYSTEM: ICD-10-CM

## 2023-08-15 DIAGNOSIS — Z80.0 FAMILY HISTORY OF MALIGNANT NEOPLASM OF DIGESTIVE ORGANS: ICD-10-CM

## 2023-08-15 DIAGNOSIS — Z86.16 PERSONAL HISTORY OF COVID-19: ICD-10-CM

## 2023-08-15 DIAGNOSIS — Z78.9 OTHER SPECIFIED HEALTH STATUS: ICD-10-CM

## 2023-08-15 PROCEDURE — 71046 X-RAY EXAM CHEST 2 VIEWS: CPT

## 2023-08-15 PROCEDURE — 94729 DIFFUSING CAPACITY: CPT

## 2023-08-15 PROCEDURE — ZZZZZ: CPT

## 2023-08-15 PROCEDURE — 94727 GAS DIL/WSHOT DETER LNG VOL: CPT

## 2023-08-15 PROCEDURE — 94010 BREATHING CAPACITY TEST: CPT

## 2023-08-15 PROCEDURE — 94618 PULMONARY STRESS TESTING: CPT

## 2023-08-15 PROCEDURE — 99204 OFFICE O/P NEW MOD 45 MIN: CPT | Mod: 25

## 2023-08-15 NOTE — ADDENDUM
[FreeTextEntry1] : Documented by Daron Villeda acting as a scribe for Dr. Zak Ramírez on 08/15/2023 .  All medical record entries made by the Scribe were at my, Dr. Zak Ramírez's, direction and personally dictated by me on 08/15/2023 . I have reviewed the chart and agree that the record accurately reflects my personal performance of the history, physical exam, assessment and plan. I have also personally directed, reviewed, and agree with the discharge instructions.

## 2023-08-15 NOTE — REASON FOR VISIT
[Initial] : an initial visit [TextBox_44] : SOB, Mild Asthma, Allergies, GERD, KENTON, Preop pulmonary clearance for bariatric surgery

## 2023-08-15 NOTE — PROCEDURE
[FreeTextEntry1] : Sleep study (2.21.2020) revealed sleep apnea with an AHI/ISELA of 18,3, snore index of % and a low oxygen saturation of 87%  Sleep study (7.10.2020) revealed sleep apnea with an AHI/ISELA of 9.8, snore index of % and a low oxygen saturation of 94%  CXR 08/15/2023 reveals a normal sized heart; no evidence of infiltrate or effusion--a normal appearing chest radiograph  Full PFT reveals normal flows; FEV1 was 2.34 L which is 106% of predicted; normal lung volumes; normal diffusion at 18.0, which is 83% of predicted; normal flow volume loop. PFTs were performed to evaluate for SOB  6 minute walk test reveals a low saturation of 94% with no evidence of dyspnea or fatigue; walked 489 meters

## 2023-08-15 NOTE — HISTORY OF PRESENT ILLNESS
[TextBox_4] : Ms. JACK  is a 53 year old female with a history of presenting to the office today for an initial pulmonary evaluation. Her chief complaint is   - contemplating bariatric surgery - she notes having had asthma since she was an infant - she notes her asthma acts up during the winter time - she notes her asthmatic sx are SOB and wheezing - she notes when she gets sick it drops into the chest and never stays as a simple cold - she notes having many bouts of bronchitis all in the winter time - she notes allergic sx when the climate changes in October/November - she notes having itchy eyes  - she notes getting 6-8 hours of sleep - she notes snoring - she notes a lump in the throat she needs to clear - she notes some heartburn and reflux - she notes waking up fatigued - she notes being able to fall asleep watching a boring TV show or as a passenger in a long car ride - she notes she occasionally gets frustrated with her concentration - she notes her memory is slightly impaired when she gets poor sleep - she notes gaining weight since COVID  - she notes she wants to get to 130 lbs  - she notes leg swelling dependent on the weather - she notes not using any inhalers for her breathing    -she denies any headaches, nausea, emesis, fever, chills, sweats, chest pain, chest pressure, coughing, wheezing, palpitations, constipation, diarrhea, vertigo, dysphagia, heartburn, reflux, itchy eyes, itchy ears, leg swelling, leg pain, arthralgias, myalgias, or sour taste in the mouth.

## 2023-08-15 NOTE — PHYSICAL EXAM
[No Acute Distress] : no acute distress [Normal Oropharynx] : normal oropharynx [Normal Appearance] : normal appearance [No Neck Mass] : no neck mass [Normal Rate/Rhythm] : normal rate/rhythm [Normal S1, S2] : normal s1, s2 [No Murmurs] : no murmurs [No Resp Distress] : no resp distress [Clear to Auscultation Bilaterally] : clear to auscultation bilaterally [No Abnormalities] : no abnormalities [Benign] : benign [Normal Gait] : normal gait [No Clubbing] : no clubbing [No Cyanosis] : no cyanosis [No Edema] : no edema [FROM] : FROM [Normal Color/ Pigmentation] : normal color/ pigmentation [No Focal Deficits] : no focal deficits [Oriented x3] : oriented x3 [Normal Affect] : normal affect [III] : Mallampati Class: III [TextBox_2] : OW [TextBox_44] : R thyroid slightly enlarged [TextBox_68] : I:E ratio 1:3; clear

## 2023-08-27 PROBLEM — R06.09 DYSPNEA ON EXERTION: Status: ACTIVE | Noted: 2023-06-21

## 2023-08-28 ENCOUNTER — APPOINTMENT (OUTPATIENT)
Dept: PULMONOLOGY | Facility: CLINIC | Age: 53
End: 2023-08-28
Payer: COMMERCIAL

## 2023-08-28 VITALS
HEART RATE: 88 BPM | DIASTOLIC BLOOD PRESSURE: 82 MMHG | SYSTOLIC BLOOD PRESSURE: 120 MMHG | HEIGHT: 60 IN | TEMPERATURE: 97.5 F | OXYGEN SATURATION: 97 % | RESPIRATION RATE: 16 BRPM | WEIGHT: 175 LBS | BODY MASS INDEX: 34.36 KG/M2

## 2023-08-28 DIAGNOSIS — Z01.811 ENCOUNTER FOR PREPROCEDURAL RESPIRATORY EXAMINATION: ICD-10-CM

## 2023-08-28 PROCEDURE — 99214 OFFICE O/P EST MOD 30 MIN: CPT

## 2023-08-29 PROBLEM — Z01.811 PREOP PULMONARY/RESPIRATORY EXAM: Status: ACTIVE | Noted: 2023-08-15

## 2023-08-29 NOTE — HISTORY OF PRESENT ILLNESS
[TextBox_4] : Ms. JACK is a 53 year old female with a history of hypothyroid, thyroid goiter, COVID-19 x2 (6/2020, 2021), fatty liver, high cholesterol, asthma, GERD, KENTON, allergies who now comes to the office for pulmonary clearance for bariatric surgery  with Dr. Jakub Blevins.   Her chief concern is APAP use.   Patient reports she has been using APAP at setting of 5-15 CMH20 for past 10 days with benefit. She states her sleep symptoms were snoring, EDS, dry mouth, AM HA and nonrestorative sleep. Patient states since she started using APAP all sleep symptoms resolved.   Patient denies any pulmonary complaints.

## 2023-08-29 NOTE — ASSESSMENT
[FreeTextEntry1] : Ms. JACK is a 53 year old female with a history of hypothyroid, thyroid goiter, COVID-19 x2 (6/2020, 2021), fatty liver, high cholesterol, asthma, GERD, KENTON, allergies who now comes to the office for pulmonary clearance for bariatric surgery  with Dr. Jakub Blevins.   1. Pulmonary clearance -Patient is asymptomatic for new or worsening pulmonary complaints.  -Patient can move forward with planned procedure at this time.  2.KENTON - I have discussed all the negative health consequences associated with obstructive and central sleep apnea including heart conditions/MI, hypertension, diabetes, chronic inflammation, memory issues, stroke, obesity, decreased libido, sleep related accidents, as well as anxiety and depression. - Additional recommendations included: Avoid alcohol and sedatives at bedtime. Proper sleep hygiene such as maintaining a regular sleep routine, avoiding naps if possible, not watching TV or reading in bed,  and maintaining a quiet, comfortable bedroom. Sleepy driving avoidance and risks discussed with patient. - Diet, exercise and weight loss suggested. - continue to use APAP @ setting of 5-20 CMH20  2. Mild Intermittent Asthma (Seasonal) -continue Ventolin 2 puffs Q6H, if sick -continue Symbicort 160 2 inhalations BID  3.Allergies / Sinus -continue Olopatadine 0.6% 1 sniff BID  4.GERD -continue Pantoprazole 40 mg QAM  Patient to follow up with Dr. Ramírez on 10/31/2023. Patient to call with further questions and concerns. Patient verbalizes understanding of care plan and is agreeable.

## 2023-08-29 NOTE — DISCUSSION/SUMMARY
[FreeTextEntry1] : Compliance reports from APAP device shows Usage days 10/10 Leaks 2L/min AHI 1.2/hr  Pressure at 95% 14.3. Discussed with patient change setting to [id number]. Patient is agreeable. Setting on device changed to [id number].

## 2023-08-29 NOTE — PROCEDURE
[FreeTextEntry1] : PFTs from 8/15/2023.   PFT'S performed in office show normal.  FEV: 106 FEV1/FVC Ratio:85 QMY63-11%:109 DLCO:83

## 2023-09-13 DIAGNOSIS — Z86.19 PERSONAL HISTORY OF OTHER INFECTIOUS AND PARASITIC DISEASES: ICD-10-CM

## 2023-09-18 ENCOUNTER — OUTPATIENT (OUTPATIENT)
Dept: OUTPATIENT SERVICES | Facility: HOSPITAL | Age: 53
LOS: 1 days | End: 2023-09-18
Payer: COMMERCIAL

## 2023-09-18 ENCOUNTER — APPOINTMENT (OUTPATIENT)
Dept: SURGERY | Facility: CLINIC | Age: 53
End: 2023-09-18
Payer: COMMERCIAL

## 2023-09-18 VITALS
RESPIRATION RATE: 15 BRPM | HEART RATE: 99 BPM | DIASTOLIC BLOOD PRESSURE: 68 MMHG | SYSTOLIC BLOOD PRESSURE: 110 MMHG | TEMPERATURE: 97 F | HEIGHT: 60 IN | WEIGHT: 171.96 LBS | OXYGEN SATURATION: 100 %

## 2023-09-18 DIAGNOSIS — Z01.818 ENCOUNTER FOR OTHER PREPROCEDURAL EXAMINATION: ICD-10-CM

## 2023-09-18 DIAGNOSIS — Z98.890 OTHER SPECIFIED POSTPROCEDURAL STATES: Chronic | ICD-10-CM

## 2023-09-18 DIAGNOSIS — Z90.49 ACQUIRED ABSENCE OF OTHER SPECIFIED PARTS OF DIGESTIVE TRACT: Chronic | ICD-10-CM

## 2023-09-18 DIAGNOSIS — G47.33 OBSTRUCTIVE SLEEP APNEA (ADULT) (PEDIATRIC): ICD-10-CM

## 2023-09-18 DIAGNOSIS — Z29.9 ENCOUNTER FOR PROPHYLACTIC MEASURES, UNSPECIFIED: ICD-10-CM

## 2023-09-18 DIAGNOSIS — E66.9 OBESITY, UNSPECIFIED: ICD-10-CM

## 2023-09-18 DIAGNOSIS — E66.01 MORBID (SEVERE) OBESITY DUE TO EXCESS CALORIES: ICD-10-CM

## 2023-09-18 DIAGNOSIS — Z98.51 TUBAL LIGATION STATUS: Chronic | ICD-10-CM

## 2023-09-18 DIAGNOSIS — Z91.040 LATEX ALLERGY STATUS: ICD-10-CM

## 2023-09-18 LAB
ALBUMIN SERPL ELPH-MCNC: 4.8 G/DL — SIGNIFICANT CHANGE UP (ref 3.3–5)
ALP SERPL-CCNC: 171 U/L — HIGH (ref 40–120)
ALT FLD-CCNC: 75 U/L — HIGH (ref 10–45)
ANION GAP SERPL CALC-SCNC: 16 MMOL/L — SIGNIFICANT CHANGE UP (ref 5–17)
AST SERPL-CCNC: 53 U/L — HIGH (ref 10–40)
BILIRUB SERPL-MCNC: 0.7 MG/DL — SIGNIFICANT CHANGE UP (ref 0.2–1.2)
BUN SERPL-MCNC: 21 MG/DL — SIGNIFICANT CHANGE UP (ref 7–23)
CALCIUM SERPL-MCNC: 10.8 MG/DL — HIGH (ref 8.4–10.5)
CHLORIDE SERPL-SCNC: 97 MMOL/L — SIGNIFICANT CHANGE UP (ref 96–108)
CO2 SERPL-SCNC: 21 MMOL/L — LOW (ref 22–31)
CREAT SERPL-MCNC: 1.06 MG/DL — SIGNIFICANT CHANGE UP (ref 0.5–1.3)
EGFR: 63 ML/MIN/1.73M2 — SIGNIFICANT CHANGE UP
GLUCOSE SERPL-MCNC: 85 MG/DL — SIGNIFICANT CHANGE UP (ref 70–99)
HCT VFR BLD CALC: 41.6 % — SIGNIFICANT CHANGE UP (ref 34.5–45)
HGB BLD-MCNC: 14.2 G/DL — SIGNIFICANT CHANGE UP (ref 11.5–15.5)
MCHC RBC-ENTMCNC: 30.1 PG — SIGNIFICANT CHANGE UP (ref 27–34)
MCHC RBC-ENTMCNC: 34.1 GM/DL — SIGNIFICANT CHANGE UP (ref 32–36)
MCV RBC AUTO: 88.1 FL — SIGNIFICANT CHANGE UP (ref 80–100)
NRBC # BLD: 0 /100 WBCS — SIGNIFICANT CHANGE UP (ref 0–0)
PLATELET # BLD AUTO: 212 K/UL — SIGNIFICANT CHANGE UP (ref 150–400)
POTASSIUM SERPL-MCNC: 4.3 MMOL/L — SIGNIFICANT CHANGE UP (ref 3.5–5.3)
POTASSIUM SERPL-SCNC: 4.3 MMOL/L — SIGNIFICANT CHANGE UP (ref 3.5–5.3)
PROT SERPL-MCNC: 8.9 G/DL — HIGH (ref 6–8.3)
RBC # BLD: 4.72 M/UL — SIGNIFICANT CHANGE UP (ref 3.8–5.2)
RBC # FLD: 12.3 % — SIGNIFICANT CHANGE UP (ref 10.3–14.5)
SODIUM SERPL-SCNC: 134 MMOL/L — LOW (ref 135–145)
WBC # BLD: 6.84 K/UL — SIGNIFICANT CHANGE UP (ref 3.8–10.5)
WBC # FLD AUTO: 6.84 K/UL — SIGNIFICANT CHANGE UP (ref 3.8–10.5)

## 2023-09-18 PROCEDURE — 86901 BLOOD TYPING SEROLOGIC RH(D): CPT

## 2023-09-18 PROCEDURE — 99215 OFFICE O/P EST HI 40 MIN: CPT

## 2023-09-18 PROCEDURE — 86850 RBC ANTIBODY SCREEN: CPT

## 2023-09-18 PROCEDURE — 86900 BLOOD TYPING SEROLOGIC ABO: CPT

## 2023-09-18 PROCEDURE — G0463: CPT

## 2023-09-18 NOTE — H&P PST ADULT - NSICDXPASTSURGICALHX_GEN_ALL_CORE_FT
PAST SURGICAL HISTORY:  S/P correction of deviated nasal septum     S/P tubal ligation      PAST SURGICAL HISTORY:  S/P cholecystectomy     S/P correction of deviated nasal septum     S/P excision of lipoma     S/P tubal ligation

## 2023-09-18 NOTE — H&P PST ADULT - ASSESSMENT
DASI score: 8.9  DASI activity: walks with dog daily, able to walk up 6 flights of stairs   Loose teeth or denture: denies     CAPRINI SCORE    AGE RELATED RISK FACTORS                                                       MOBILITY RELATED FACTORS  [ x] Age 41-60 years                                            (1 Point)                  [ ] Bed rest                                                        (1 Point)  [ ] Age: 61-74 years                                           (2 Points)                [ ] Plaster cast                                                   (2 Points)  [ ] Age= 75 years                                              (3 Points)                 [ ] Bed bound for more than 72 hours                   (2 Points)    DISEASE RELATED RISK FACTORS                                               GENDER SPECIFIC FACTORS  [ ] Edema in the lower extremities                       (1 Point)                  [ ] Pregnancy                                                     (1 Point)  [ ] Varicose veins                                               (1 Point)                  [ ] Post-partum < 6 weeks                                   (1 Point)             [x ] BMI > 25 Kg/m2                                            (1 Point)                  [ ] Hormonal therapy  or oral contraception            (1 Point)                 [ ] Sepsis (in the previous month)                        (1 Point)                  [ ] History of pregnancy complications  [ ] Pneumonia or serious lung disease                                               [ ] Unexplained or recurrent                       (1 Point)           (in the previous month)                               (1 Point)  [ ] Abnormal pulmonary function test                     (1 Point)                 SURGERY RELATED RISK FACTORS  [ ] Acute myocardial infarction                              (1 Point)                 [ ]  Section                                            (1 Point)  [ ] Congestive heart failure (in the previous month)  (1 Point)                 [ ] Minor surgery                                                 (1 Point)   [ ] Inflammatory bowel disease                             (1 Point)                 [ ] Arthroscopic surgery                                        (2 Points)  [ ] Central venous access                                    (2 Points)                [ x] General surgery lasting more than 45 minutes   (2 Points)       [ ] Stroke (in the previous month)                          (5 Points)               [ ] Elective arthroplasty                                        (5 Points)                                                                                                                                               HEMATOLOGY RELATED FACTORS                                                 TRAUMA RELATED RISK FACTORS  [ ] Prior episodes of VTE                                     (3 Points)                 [ ] Fracture of the hip, pelvis, or leg                       (5 Points)  [ ] Positive family history for VTE                         (3 Points)                 [ ] Acute spinal cord injury (in the previous month)  (5 Points)  [ ] Prothrombin 47829 A                                      (3 Points)                 [ ] Paralysis  (less than 1 month)                          (5 Points)  [ ] Factor V Leiden                                             (3 Points)                 [ ] Multiple Trauma within 1 month                         (5 Points)  [ ] Lupus anticoagulants                                     (3 Points)                                                           [ ] Anticardiolipin antibodies                                (3 Points)                                                       [ ] High homocysteine in the blood                      (3 Points)                                             [ ] Other congenital or acquired thrombophilia       (3 Points)                                                [ ] Heparin induced thrombocytopenia                  (3 Points)                                          Total Score [    4      ]

## 2023-09-18 NOTE — H&P PST ADULT - HISTORY OF PRESENT ILLNESS
53yo right hand dominant female patient with approximately 3yr history of progressively enlarging mass on left forearm. She states that she has pain when it is palpated and at times the pain radiates to her left wrist. She rates the pain when it occurs at 7/10. She is not taking anything for pain. Excision has been recommended and she presents today for PSTs.  52yo female.  PMH KENTON with CPAP, fatty liver, hypothyroid, obesity (BMI=33), presents to PST scheduled for laparoscopic vertical sleeve gastrectomy on 9/27.    *emailed pharmacist Karsten to educate pt in regard to post op medication modification.

## 2023-09-19 LAB
A1C WITH ESTIMATED AVERAGE GLUCOSE RESULT: 5.3 % — SIGNIFICANT CHANGE UP (ref 4–5.6)
ESTIMATED AVERAGE GLUCOSE: 105 MG/DL — SIGNIFICANT CHANGE UP (ref 68–114)
MRSA PCR RESULT.: SIGNIFICANT CHANGE UP
S AUREUS DNA NOSE QL NAA+PROBE: SIGNIFICANT CHANGE UP

## 2023-09-21 NOTE — PHARMACOTHERAPY INTERVENTION NOTE - COMMENTS
Initial Pharmacy Review    HPI:  54yo female presents for PST scheduled for laparoscopic vertical sleeve gastrectomy on 9/27.  PMH KENTON with CPAP, fatty liver, hypothyroid, obesity (BMI=33).     Home Medications:  biotin 1tab PO daily: 1 tab(s) orally once a day  Multiple Vitamins oral tablet: 1 tab(s) orally once a day  Synthroid 75 mcg (0.075 mg) oral tablet: 1 tab(s) orally once a day    Discharge Recommendations:  Continue vitamins/supplements in chewable/dissolvable forms (MVI, Biotin)  Crush Synthroid tablets    Lori Fabian, Lexus[employer], BCPS  Available on [employer] Teams

## 2023-09-25 LAB
25(OH)D3 SERPL-MCNC: 36.9 NG/ML
ALBUMIN SERPL ELPH-MCNC: 4.4 G/DL
ALP BLD-CCNC: 138 U/L
ALT SERPL-CCNC: 37 U/L
ANION GAP SERPL CALC-SCNC: 9 MMOL/L
APTT BLD: 28.4 SEC
AST SERPL-CCNC: 32 U/L
BILIRUB SERPL-MCNC: 0.6 MG/DL
BUN SERPL-MCNC: 13 MG/DL
CALCIUM SERPL-MCNC: 9.9 MG/DL
CHLORIDE SERPL-SCNC: 103 MMOL/L
CO2 SERPL-SCNC: 25 MMOL/L
CREAT SERPL-MCNC: 0.78 MG/DL
EGFR: 91 ML/MIN/1.73M2
ESTIMATED AVERAGE GLUCOSE: 105 MG/DL
FOLATE SERPL-MCNC: >20 NG/ML
GLUCOSE SERPL-MCNC: 91 MG/DL
HBA1C MFR BLD HPLC: 5.3 %
HCG SERPL QL: NEGATIVE
INR PPP: 1.01 RATIO
PAPP-A SERPL-ACNC: 1 MIU/ML
POTASSIUM SERPL-SCNC: 4.1 MMOL/L
PROT SERPL-MCNC: 7.2 G/DL
PT BLD: 11.4 SEC
SODIUM SERPL-SCNC: 138 MMOL/L
TSH SERPL-ACNC: 3.75 UIU/ML
VIT B12 SERPL-MCNC: 405 PG/ML

## 2023-09-26 ENCOUNTER — TRANSCRIPTION ENCOUNTER (OUTPATIENT)
Age: 53
End: 2023-09-26

## 2023-09-27 ENCOUNTER — INPATIENT (INPATIENT)
Facility: HOSPITAL | Age: 53
LOS: 0 days | Discharge: HOME | DRG: 621 | End: 2023-09-28
Attending: SURGERY | Admitting: SURGERY
Payer: COMMERCIAL

## 2023-09-27 ENCOUNTER — TRANSCRIPTION ENCOUNTER (OUTPATIENT)
Age: 53
End: 2023-09-27

## 2023-09-27 ENCOUNTER — RESULT REVIEW (OUTPATIENT)
Age: 53
End: 2023-09-27

## 2023-09-27 ENCOUNTER — APPOINTMENT (OUTPATIENT)
Dept: SURGERY | Facility: HOSPITAL | Age: 53
End: 2023-09-27
Payer: COMMERCIAL

## 2023-09-27 VITALS
OXYGEN SATURATION: 96 % | SYSTOLIC BLOOD PRESSURE: 121 MMHG | DIASTOLIC BLOOD PRESSURE: 81 MMHG | RESPIRATION RATE: 16 BRPM | HEIGHT: 60 IN | WEIGHT: 170.42 LBS | HEART RATE: 89 BPM | TEMPERATURE: 98 F

## 2023-09-27 DIAGNOSIS — Z98.51 TUBAL LIGATION STATUS: Chronic | ICD-10-CM

## 2023-09-27 DIAGNOSIS — Z98.890 OTHER SPECIFIED POSTPROCEDURAL STATES: Chronic | ICD-10-CM

## 2023-09-27 DIAGNOSIS — Z90.49 ACQUIRED ABSENCE OF OTHER SPECIFIED PARTS OF DIGESTIVE TRACT: Chronic | ICD-10-CM

## 2023-09-27 DIAGNOSIS — E66.01 MORBID (SEVERE) OBESITY DUE TO EXCESS CALORIES: ICD-10-CM

## 2023-09-27 LAB
ANION GAP SERPL CALC-SCNC: 13 MMOL/L — SIGNIFICANT CHANGE UP (ref 5–17)
BASOPHILS # BLD AUTO: 0.02 K/UL — SIGNIFICANT CHANGE UP (ref 0–0.2)
BASOPHILS NFR BLD AUTO: 0.5 % — SIGNIFICANT CHANGE UP (ref 0–2)
BUN SERPL-MCNC: 14 MG/DL — SIGNIFICANT CHANGE UP (ref 7–23)
CALCIUM SERPL-MCNC: 9 MG/DL — SIGNIFICANT CHANGE UP (ref 8.4–10.5)
CHLORIDE SERPL-SCNC: 103 MMOL/L — SIGNIFICANT CHANGE UP (ref 96–108)
CO2 SERPL-SCNC: 21 MMOL/L — LOW (ref 22–31)
CREAT SERPL-MCNC: 0.83 MG/DL — SIGNIFICANT CHANGE UP (ref 0.5–1.3)
EGFR: 84 ML/MIN/1.73M2 — SIGNIFICANT CHANGE UP
EOSINOPHIL # BLD AUTO: 0.03 K/UL — SIGNIFICANT CHANGE UP (ref 0–0.5)
EOSINOPHIL NFR BLD AUTO: 0.7 % — SIGNIFICANT CHANGE UP (ref 0–6)
GLUCOSE BLDC GLUCOMTR-MCNC: 91 MG/DL — SIGNIFICANT CHANGE UP (ref 70–99)
GLUCOSE SERPL-MCNC: 92 MG/DL — SIGNIFICANT CHANGE UP (ref 70–99)
HCT VFR BLD CALC: 35.7 % — SIGNIFICANT CHANGE UP (ref 34.5–45)
HGB BLD-MCNC: 12.3 G/DL — SIGNIFICANT CHANGE UP (ref 11.5–15.5)
IMM GRANULOCYTES NFR BLD AUTO: 0.2 % — SIGNIFICANT CHANGE UP (ref 0–0.9)
LYMPHOCYTES # BLD AUTO: 1.16 K/UL — SIGNIFICANT CHANGE UP (ref 1–3.3)
LYMPHOCYTES # BLD AUTO: 27.6 % — SIGNIFICANT CHANGE UP (ref 13–44)
MAGNESIUM SERPL-MCNC: 2 MG/DL — SIGNIFICANT CHANGE UP (ref 1.6–2.6)
MCHC RBC-ENTMCNC: 30.8 PG — SIGNIFICANT CHANGE UP (ref 27–34)
MCHC RBC-ENTMCNC: 34.5 GM/DL — SIGNIFICANT CHANGE UP (ref 32–36)
MCV RBC AUTO: 89.3 FL — SIGNIFICANT CHANGE UP (ref 80–100)
MONOCYTES # BLD AUTO: 0.22 K/UL — SIGNIFICANT CHANGE UP (ref 0–0.9)
MONOCYTES NFR BLD AUTO: 5.2 % — SIGNIFICANT CHANGE UP (ref 2–14)
NEUTROPHILS # BLD AUTO: 2.77 K/UL — SIGNIFICANT CHANGE UP (ref 1.8–7.4)
NEUTROPHILS NFR BLD AUTO: 65.8 % — SIGNIFICANT CHANGE UP (ref 43–77)
NRBC # BLD: 0 /100 WBCS — SIGNIFICANT CHANGE UP (ref 0–0)
PHOSPHATE SERPL-MCNC: 3.7 MG/DL — SIGNIFICANT CHANGE UP (ref 2.5–4.5)
PLATELET # BLD AUTO: 106 K/UL — LOW (ref 150–400)
POTASSIUM SERPL-MCNC: 4.1 MMOL/L — SIGNIFICANT CHANGE UP (ref 3.5–5.3)
POTASSIUM SERPL-SCNC: 4.1 MMOL/L — SIGNIFICANT CHANGE UP (ref 3.5–5.3)
RBC # BLD: 4 M/UL — SIGNIFICANT CHANGE UP (ref 3.8–5.2)
RBC # FLD: 12.3 % — SIGNIFICANT CHANGE UP (ref 10.3–14.5)
SODIUM SERPL-SCNC: 137 MMOL/L — SIGNIFICANT CHANGE UP (ref 135–145)
WBC # BLD: 4.21 K/UL — SIGNIFICANT CHANGE UP (ref 3.8–10.5)
WBC # FLD AUTO: 4.21 K/UL — SIGNIFICANT CHANGE UP (ref 3.8–10.5)

## 2023-09-27 PROCEDURE — 43775 LAP SLEEVE GASTRECTOMY: CPT | Mod: 82

## 2023-09-27 PROCEDURE — 43775 LAP SLEEVE GASTRECTOMY: CPT

## 2023-09-27 PROCEDURE — 88307 TISSUE EXAM BY PATHOLOGIST: CPT | Mod: 26

## 2023-09-27 DEVICE — CLIP APPLIER COVIDIEN ENDOCLIP III 5MM: Type: IMPLANTABLE DEVICE | Status: FUNCTIONAL

## 2023-09-27 DEVICE — STAPLER COVIDIEN TRI-STAPLE 60MM PURPLE INTELLIGENT RELOAD: Type: IMPLANTABLE DEVICE | Status: FUNCTIONAL

## 2023-09-27 DEVICE — STAPLER COVIDIEN TRI-STAPLE 45MM BLACK INTELLIGENT RELOAD: Type: IMPLANTABLE DEVICE | Status: FUNCTIONAL

## 2023-09-27 DEVICE — VISTASEAL FIBRIN HUMAN 4ML: Type: IMPLANTABLE DEVICE | Status: FUNCTIONAL

## 2023-09-27 RX ORDER — HYOSCYAMINE SULFATE 0.13 MG
1 TABLET ORAL
Qty: 28 | Refills: 0
Start: 2023-09-27 | End: 2023-10-03

## 2023-09-27 RX ORDER — CEFAZOLIN SODIUM 1 G
2000 VIAL (EA) INJECTION EVERY 8 HOURS
Refills: 0 | Status: COMPLETED | OUTPATIENT
Start: 2023-09-27 | End: 2023-09-28

## 2023-09-27 RX ORDER — SODIUM CHLORIDE 9 MG/ML
1000 INJECTION, SOLUTION INTRAVENOUS
Refills: 0 | Status: DISCONTINUED | OUTPATIENT
Start: 2023-09-27 | End: 2023-09-28

## 2023-09-27 RX ORDER — ONDANSETRON 8 MG/1
4 TABLET, FILM COATED ORAL ONCE
Refills: 0 | Status: DISCONTINUED | OUTPATIENT
Start: 2023-09-27 | End: 2023-09-27

## 2023-09-27 RX ORDER — HEPARIN SODIUM 5000 [USP'U]/ML
5000 INJECTION INTRAVENOUS; SUBCUTANEOUS EVERY 8 HOURS
Refills: 0 | Status: DISCONTINUED | OUTPATIENT
Start: 2023-09-27 | End: 2023-09-28

## 2023-09-27 RX ORDER — OMEPRAZOLE 10 MG/1
1 CAPSULE, DELAYED RELEASE ORAL
Qty: 30 | Refills: 0
Start: 2023-09-27 | End: 2023-10-26

## 2023-09-27 RX ORDER — FOLIC ACID 0.8 MG
1 TABLET ORAL ONCE
Refills: 0 | Status: COMPLETED | OUTPATIENT
Start: 2023-09-27 | End: 2023-09-27

## 2023-09-27 RX ORDER — LIDOCAINE HCL 20 MG/ML
0.2 VIAL (ML) INJECTION ONCE
Refills: 0 | Status: DISCONTINUED | OUTPATIENT
Start: 2023-09-27 | End: 2023-09-27

## 2023-09-27 RX ORDER — ONDANSETRON 8 MG/1
4 TABLET, FILM COATED ORAL EVERY 6 HOURS
Refills: 0 | Status: DISCONTINUED | OUTPATIENT
Start: 2023-09-27 | End: 2023-09-28

## 2023-09-27 RX ORDER — ACETAMINOPHEN 500 MG
1000 TABLET ORAL ONCE
Refills: 0 | Status: COMPLETED | OUTPATIENT
Start: 2023-09-27 | End: 2023-09-27

## 2023-09-27 RX ORDER — CEFAZOLIN SODIUM 1 G
2000 VIAL (EA) INJECTION ONCE
Refills: 0 | Status: DISCONTINUED | OUTPATIENT
Start: 2023-09-27 | End: 2023-09-27

## 2023-09-27 RX ORDER — THIAMINE MONONITRATE (VIT B1) 100 MG
100 TABLET ORAL ONCE
Refills: 0 | Status: COMPLETED | OUTPATIENT
Start: 2023-09-27 | End: 2023-09-27

## 2023-09-27 RX ORDER — FOSAPREPITANT DIMEGLUMINE 150 MG/5ML
150 INJECTION, POWDER, LYOPHILIZED, FOR SOLUTION INTRAVENOUS ONCE
Refills: 0 | Status: COMPLETED | OUTPATIENT
Start: 2023-09-27 | End: 2023-09-27

## 2023-09-27 RX ORDER — SODIUM CHLORIDE 9 MG/ML
1 INJECTION INTRAMUSCULAR; INTRAVENOUS; SUBCUTANEOUS EVERY 8 HOURS
Refills: 0 | Status: DISCONTINUED | OUTPATIENT
Start: 2023-09-27 | End: 2023-09-27

## 2023-09-27 RX ORDER — PANTOPRAZOLE SODIUM 20 MG/1
40 TABLET, DELAYED RELEASE ORAL EVERY 24 HOURS
Refills: 0 | Status: DISCONTINUED | OUTPATIENT
Start: 2023-09-27 | End: 2023-09-28

## 2023-09-27 RX ORDER — ACETAMINOPHEN 500 MG
1000 TABLET ORAL ONCE
Refills: 0 | Status: COMPLETED | OUTPATIENT
Start: 2023-09-28 | End: 2023-09-28

## 2023-09-27 RX ORDER — FENTANYL CITRATE 50 UG/ML
25 INJECTION INTRAVENOUS
Refills: 0 | Status: DISCONTINUED | OUTPATIENT
Start: 2023-09-27 | End: 2023-09-27

## 2023-09-27 RX ORDER — HYOSCYAMINE SULFATE 0.13 MG
0.12 TABLET ORAL EVERY 6 HOURS
Refills: 0 | Status: DISCONTINUED | OUTPATIENT
Start: 2023-09-27 | End: 2023-09-28

## 2023-09-27 RX ORDER — FOSAPREPITANT DIMEGLUMINE 150 MG/5ML
150 INJECTION, POWDER, LYOPHILIZED, FOR SOLUTION INTRAVENOUS ONCE
Refills: 0 | Status: DISCONTINUED | OUTPATIENT
Start: 2023-09-27 | End: 2023-09-27

## 2023-09-27 RX ORDER — HEPARIN SODIUM 5000 [USP'U]/ML
5000 INJECTION INTRAVENOUS; SUBCUTANEOUS EVERY 8 HOURS
Refills: 0 | Status: COMPLETED | OUTPATIENT
Start: 2023-09-27 | End: 2023-09-27

## 2023-09-27 RX ORDER — LEVOTHYROXINE SODIUM 125 MCG
1 TABLET ORAL
Qty: 0 | Refills: 0 | DISCHARGE

## 2023-09-27 RX ORDER — SODIUM CHLORIDE 9 MG/ML
3 INJECTION INTRAMUSCULAR; INTRAVENOUS; SUBCUTANEOUS EVERY 8 HOURS
Refills: 0 | Status: DISCONTINUED | OUTPATIENT
Start: 2023-09-27 | End: 2023-09-27

## 2023-09-27 RX ORDER — KETOROLAC TROMETHAMINE 30 MG/ML
30 SYRINGE (ML) INJECTION EVERY 6 HOURS
Refills: 0 | Status: DISCONTINUED | OUTPATIENT
Start: 2023-09-27 | End: 2023-09-28

## 2023-09-27 RX ORDER — CEFAZOLIN SODIUM 1 G
2000 VIAL (EA) INJECTION ONCE
Refills: 0 | Status: COMPLETED | OUTPATIENT
Start: 2023-09-27 | End: 2023-09-27

## 2023-09-27 RX ORDER — INFLUENZA VIRUS VACCINE 15; 15; 15; 15 UG/.5ML; UG/.5ML; UG/.5ML; UG/.5ML
0.5 SUSPENSION INTRAMUSCULAR ONCE
Refills: 0 | Status: DISCONTINUED | OUTPATIENT
Start: 2023-09-27 | End: 2023-09-28

## 2023-09-27 RX ORDER — ACETAMINOPHEN 500 MG
15 TABLET ORAL
Qty: 300 | Refills: 0
Start: 2023-09-27 | End: 2023-10-01

## 2023-09-27 RX ORDER — HEPARIN SODIUM 5000 [USP'U]/ML
5000 INJECTION INTRAVENOUS; SUBCUTANEOUS ONCE
Refills: 0 | Status: DISCONTINUED | OUTPATIENT
Start: 2023-09-27 | End: 2023-09-27

## 2023-09-27 RX ORDER — ONDANSETRON 8 MG/1
1 TABLET, FILM COATED ORAL
Qty: 28 | Refills: 0
Start: 2023-09-27 | End: 2023-10-03

## 2023-09-27 RX ORDER — CHLORHEXIDINE GLUCONATE 213 G/1000ML
1 SOLUTION TOPICAL ONCE
Refills: 0 | Status: DISCONTINUED | OUTPATIENT
Start: 2023-09-27 | End: 2023-09-27

## 2023-09-27 RX ADMIN — Medication 400 MILLIGRAM(S): at 19:50

## 2023-09-27 RX ADMIN — Medication 1 MILLIGRAM(S): at 17:47

## 2023-09-27 RX ADMIN — Medication 0.12 MILLIGRAM(S): at 17:51

## 2023-09-27 RX ADMIN — Medication 0.12 MILLIGRAM(S): at 23:28

## 2023-09-27 RX ADMIN — HEPARIN SODIUM 5000 UNIT(S): 5000 INJECTION INTRAVENOUS; SUBCUTANEOUS at 17:51

## 2023-09-27 RX ADMIN — Medication 30 MILLIGRAM(S): at 18:15

## 2023-09-27 RX ADMIN — Medication 30 MILLIGRAM(S): at 17:49

## 2023-09-27 RX ADMIN — Medication 100 MILLIGRAM(S): at 17:48

## 2023-09-27 RX ADMIN — HEPARIN SODIUM 5000 UNIT(S): 5000 INJECTION INTRAVENOUS; SUBCUTANEOUS at 11:12

## 2023-09-27 RX ADMIN — ONDANSETRON 4 MILLIGRAM(S): 8 TABLET, FILM COATED ORAL at 17:51

## 2023-09-27 RX ADMIN — SODIUM CHLORIDE 250 MILLILITER(S): 9 INJECTION, SOLUTION INTRAVENOUS at 17:49

## 2023-09-27 RX ADMIN — PANTOPRAZOLE SODIUM 40 MILLIGRAM(S): 20 TABLET, DELAYED RELEASE ORAL at 19:50

## 2023-09-27 RX ADMIN — FOSAPREPITANT DIMEGLUMINE 300 MILLIGRAM(S): 150 INJECTION, POWDER, LYOPHILIZED, FOR SOLUTION INTRAVENOUS at 11:06

## 2023-09-27 RX ADMIN — ONDANSETRON 4 MILLIGRAM(S): 8 TABLET, FILM COATED ORAL at 23:27

## 2023-09-27 RX ADMIN — Medication 100 MILLIGRAM(S): at 21:53

## 2023-09-27 RX ADMIN — Medication 1000 MILLIGRAM(S): at 20:50

## 2023-09-27 NOTE — PROGRESS NOTE ADULT - SUBJECTIVE AND OBJECTIVE BOX
Post Operative Check    Patient is post op from a Laparoscopic sleeve gastrectomy and is recovering well in PACU. Patient endorses only mild tenderness. Denies any n/v. Ambulated to bathroom. Has voided. - flatus. - BM. Denies fevers, chills, SOB, CP.    Vitals    T(C): 36.3 (09-27-23 @ 19:00), Max: 36.4 (09-27-23 @ 11:08)  HR: 72 (09-27-23 @ 19:00) (63 - 89)  BP: 106/58 (09-27-23 @ 19:00) (80/46 - 130/67)  RR: 12 (09-27-23 @ 19:00) (12 - 16)  SpO2: 96% (09-27-23 @ 19:00) (91% - 100%)      09-27 @ 07:01  -  09-27 @ 20:00  --------------------------------------------------------  IN:    sodium chloride 0.9% w/ Additives: 750 mL  Total IN: 750 mL    OUT:    Voided (mL): 250 mL  Total OUT: 250 mL    Total NET: 500 mL          Labs                        12.3   4.21  )-----------( 106      ( 27 Sep 2023 15:54 )             35.7       CBC Full  -  ( 27 Sep 2023 15:54 )  WBC Count : 4.21 K/uL  Hemoglobin : 12.3 g/dL  Hematocrit : 35.7 %  Platelet Count - Automated : 106 K/uL  Mean Cell Volume : 89.3 fl  Mean Cell Hemoglobin : 30.8 pg  Mean Cell Hemoglobin Concentration : 34.5 gm/dL  Auto Neutrophil # : 2.77 K/uL  Auto Lymphocyte # : 1.16 K/uL  Auto Monocyte # : 0.22 K/uL  Auto Eosinophil # : 0.03 K/uL  Auto Basophil # : 0.02 K/uL  Auto Neutrophil % : 65.8 %  Auto Lymphocyte % : 27.6 %  Auto Monocyte % : 5.2 %  Auto Eosinophil % : 0.7 %  Auto Basophil % : 0.5 %      Physical Exam  General: sitting in chair, nad, well appearing  CV: rrr  resp: normal work of breathing  Abdomen: soft, appropriately tender near incision sites, nondistended, incision sites covered with steris with minimal strikethrough      Patient is a 53y old Female PMH KENTON with CPAP, fatty liver, hypothyroid, obesity (BMI=33) s/p 9/27 Laparoscopic sleeve gastrectomy and is recovering well.    Plan:  - Skyler erp  - Skyler clears  - IVF  - pain control  - zofran prn  - SQH  - oob as tolerating  - monitor bowel function    [facility]  [id number]

## 2023-09-27 NOTE — PATIENT PROFILE ADULT - FALL HARM RISK - UNIVERSAL INTERVENTIONS
Bed in lowest position, wheels locked, appropriate side rails in place/Call bell, personal items and telephone in reach/Instruct patient to call for assistance before getting out of bed or chair/Non-slip footwear when patient is out of bed/[place] to call system/Physically safe environment - no spills, clutter or unnecessary equipment/Purposeful Proactive Rounding/Room/bathroom lighting operational, light cord in reach

## 2023-09-28 ENCOUNTER — TRANSCRIPTION ENCOUNTER (OUTPATIENT)
Age: 53
End: 2023-09-28

## 2023-09-28 VITALS
DIASTOLIC BLOOD PRESSURE: 90 MMHG | HEART RATE: 69 BPM | OXYGEN SATURATION: 96 % | SYSTOLIC BLOOD PRESSURE: 109 MMHG | TEMPERATURE: 98 F | RESPIRATION RATE: 18 BRPM

## 2023-09-28 LAB
ANION GAP SERPL CALC-SCNC: 14 MMOL/L — SIGNIFICANT CHANGE UP (ref 5–17)
BASOPHILS # BLD AUTO: 0 K/UL — SIGNIFICANT CHANGE UP (ref 0–0.2)
BASOPHILS NFR BLD AUTO: 0 % — SIGNIFICANT CHANGE UP (ref 0–2)
BUN SERPL-MCNC: 14 MG/DL — SIGNIFICANT CHANGE UP (ref 7–23)
CALCIUM SERPL-MCNC: 9.8 MG/DL — SIGNIFICANT CHANGE UP (ref 8.4–10.5)
CHLORIDE SERPL-SCNC: 103 MMOL/L — SIGNIFICANT CHANGE UP (ref 96–108)
CO2 SERPL-SCNC: 20 MMOL/L — LOW (ref 22–31)
CREAT SERPL-MCNC: 0.81 MG/DL — SIGNIFICANT CHANGE UP (ref 0.5–1.3)
EGFR: 87 ML/MIN/1.73M2 — SIGNIFICANT CHANGE UP
EOSINOPHIL # BLD AUTO: 0 K/UL — SIGNIFICANT CHANGE UP (ref 0–0.5)
EOSINOPHIL NFR BLD AUTO: 0 % — SIGNIFICANT CHANGE UP (ref 0–6)
GLUCOSE SERPL-MCNC: 96 MG/DL — SIGNIFICANT CHANGE UP (ref 70–99)
HCT VFR BLD CALC: 36.1 % — SIGNIFICANT CHANGE UP (ref 34.5–45)
HGB BLD-MCNC: 12.5 G/DL — SIGNIFICANT CHANGE UP (ref 11.5–15.5)
IMM GRANULOCYTES NFR BLD AUTO: 0.4 % — SIGNIFICANT CHANGE UP (ref 0–0.9)
LYMPHOCYTES # BLD AUTO: 0.75 K/UL — LOW (ref 1–3.3)
LYMPHOCYTES # BLD AUTO: 15 % — SIGNIFICANT CHANGE UP (ref 13–44)
MCHC RBC-ENTMCNC: 30.8 PG — SIGNIFICANT CHANGE UP (ref 27–34)
MCHC RBC-ENTMCNC: 34.6 GM/DL — SIGNIFICANT CHANGE UP (ref 32–36)
MCV RBC AUTO: 88.9 FL — SIGNIFICANT CHANGE UP (ref 80–100)
MONOCYTES # BLD AUTO: 0.07 K/UL — SIGNIFICANT CHANGE UP (ref 0–0.9)
MONOCYTES NFR BLD AUTO: 1.4 % — LOW (ref 2–14)
NEUTROPHILS # BLD AUTO: 4.15 K/UL — SIGNIFICANT CHANGE UP (ref 1.8–7.4)
NEUTROPHILS NFR BLD AUTO: 83.2 % — HIGH (ref 43–77)
NRBC # BLD: 0 /100 WBCS — SIGNIFICANT CHANGE UP (ref 0–0)
PLATELET # BLD AUTO: 123 K/UL — LOW (ref 150–400)
POTASSIUM SERPL-MCNC: 4.5 MMOL/L — SIGNIFICANT CHANGE UP (ref 3.5–5.3)
POTASSIUM SERPL-SCNC: 4.5 MMOL/L — SIGNIFICANT CHANGE UP (ref 3.5–5.3)
RBC # BLD: 4.06 M/UL — SIGNIFICANT CHANGE UP (ref 3.8–5.2)
RBC # FLD: 12.3 % — SIGNIFICANT CHANGE UP (ref 10.3–14.5)
SODIUM SERPL-SCNC: 137 MMOL/L — SIGNIFICANT CHANGE UP (ref 135–145)
VIT B1 SERPL-MCNC: 85.1 NMOL/L
WBC # BLD: 4.99 K/UL — SIGNIFICANT CHANGE UP (ref 3.8–10.5)
WBC # FLD AUTO: 4.99 K/UL — SIGNIFICANT CHANGE UP (ref 3.8–10.5)

## 2023-09-28 PROCEDURE — 82962 GLUCOSE BLOOD TEST: CPT

## 2023-09-28 PROCEDURE — 85025 COMPLETE CBC W/AUTO DIFF WBC: CPT

## 2023-09-28 PROCEDURE — 80048 BASIC METABOLIC PNL TOTAL CA: CPT

## 2023-09-28 PROCEDURE — 88307 TISSUE EXAM BY PATHOLOGIST: CPT

## 2023-09-28 PROCEDURE — 84100 ASSAY OF PHOSPHORUS: CPT

## 2023-09-28 PROCEDURE — C1889: CPT

## 2023-09-28 PROCEDURE — 36415 COLL VENOUS BLD VENIPUNCTURE: CPT

## 2023-09-28 PROCEDURE — 83735 ASSAY OF MAGNESIUM: CPT

## 2023-09-28 PROCEDURE — C9399: CPT

## 2023-09-28 RX ORDER — ENOXAPARIN SODIUM 100 MG/ML
40 INJECTION SUBCUTANEOUS
Qty: 5.6 | Refills: 0
Start: 2023-09-28 | End: 2023-10-11

## 2023-09-28 RX ADMIN — Medication 30 MILLIGRAM(S): at 00:38

## 2023-09-28 RX ADMIN — HEPARIN SODIUM 5000 UNIT(S): 5000 INJECTION INTRAVENOUS; SUBCUTANEOUS at 00:39

## 2023-09-28 RX ADMIN — Medication 30 MILLIGRAM(S): at 11:23

## 2023-09-28 RX ADMIN — SODIUM CHLORIDE 250 MILLILITER(S): 9 INJECTION, SOLUTION INTRAVENOUS at 11:24

## 2023-09-28 RX ADMIN — ONDANSETRON 4 MILLIGRAM(S): 8 TABLET, FILM COATED ORAL at 05:50

## 2023-09-28 RX ADMIN — Medication 0.12 MILLIGRAM(S): at 05:53

## 2023-09-28 RX ADMIN — Medication 0.12 MILLIGRAM(S): at 11:24

## 2023-09-28 RX ADMIN — Medication 400 MILLIGRAM(S): at 08:17

## 2023-09-28 RX ADMIN — Medication 100 MILLIGRAM(S): at 05:50

## 2023-09-28 RX ADMIN — HEPARIN SODIUM 5000 UNIT(S): 5000 INJECTION INTRAVENOUS; SUBCUTANEOUS at 08:19

## 2023-09-28 RX ADMIN — Medication 30 MILLIGRAM(S): at 11:50

## 2023-09-28 RX ADMIN — Medication 400 MILLIGRAM(S): at 02:26

## 2023-09-28 RX ADMIN — ONDANSETRON 4 MILLIGRAM(S): 8 TABLET, FILM COATED ORAL at 11:24

## 2023-09-28 RX ADMIN — Medication 30 MILLIGRAM(S): at 01:08

## 2023-09-28 RX ADMIN — Medication 30 MILLIGRAM(S): at 06:27

## 2023-09-28 RX ADMIN — Medication 1000 MILLIGRAM(S): at 02:56

## 2023-09-28 RX ADMIN — Medication 1000 MILLIGRAM(S): at 08:40

## 2023-09-28 RX ADMIN — Medication 30 MILLIGRAM(S): at 05:57

## 2023-09-28 NOTE — PROGRESS NOTE ADULT - ASSESSMENT
53y old Female PMH KENTON with CPAP, fatty liver, hypothyroid, obesity (BMI=33) s/p 9/27 Laparoscopic sleeve gastrectomy and is recovering well.    Plan:  - Skyler erp  - Skyler clears  - IVF  - pain control  - zofran prn  - SQH  - oob as tolerating  - monitor bowel function    [facility]  [id number]   53y old Female PMH KENTON with CPAP, fatty liver, hypothyroid, obesity (BMI=33) s/p 9/27 Laparoscopic sleeve gastrectomy and is recovering well.    Plan:  - Skyler erp  - Skyler clears  - Encourage OOB  - IVF  - pain control  - zofran prn  - SQH  - monitor bowel function    [facility]  [id number]

## 2023-09-28 NOTE — DISCHARGE NOTE PROVIDER - NSDCMRMEDTOKEN_GEN_ALL_CORE_FT
acetaminophen 500 mg/15 mL oral liquid: 15 milliliter(s) orally every 6 hours as needed for -for pain  biotin 1tab  PO daily:   hyoscyamine 0.125 mg sublingual tablet: 1 tab(s) sublingual every 6 hours as needed for  gastric spasm MDD: 4  Lovenox 40 mg/0.4 mL injectable solution: 40 milligram(s) subcutaneously once a day vte prophylaxis  Multiple Vitamins oral tablet: 1 tab(s) orally once a day  omeprazole 40 mg oral delayed release capsule: 1 cap(s) orally once a day open and mix in applesauce MDD: 1  ondansetron 4 mg oral tablet, disintegratin tab(s) orally every 6 hours as needed for -for nausea  Synthroid 75 mcg (0.075 mg) oral tablet: 1 tab(s) orally once a day

## 2023-09-28 NOTE — DISCHARGE NOTE PROVIDER - HOSPITAL COURSE
On 9/27/23 Ms Velazco who has a history of KENTON with CPAP, fatty liver, hypothyroid, obesity (BMI=33), underwent Laparoscopic Sleeve Gastrectomy. Bariatric ERAS protocol followed to include preoperative and perioperative use of DVT and SSI prophylaxis as well as multi-modal non-opioid analgesics. Patient tolerated the procedure well  extubated in the operating room then transferred to the PACU in stable condition. Once hemodynamically stable and effective pain control the patient was able to ambulate with assistance. Pt was also able to void within 4 hours following the procedure. The patient was later transferred to a bariatric unit and placed on bedside continuous pulse oximetry. Pain managment included IV multi-modal non-opioid analgesia then transitioned to liquid as needed.  Bariatric clear liquid diet started the day of surgery.    On POD #1 patient remained stable with no acute events overnight, has effective  pain control. Denies nausea and vomiting. Patient is ambulating independently and voiding as expected. The rest of the hospital course was uneventful and there were no post-operative complications identified.  Patient met 8[facility]Point Bariatric Surgery D/C criteria and subsequently cleared for discharge home on POD#1. 2 weeks extended VTE prophylaxis low risk ([facility] VTE Risk Stratification Risk  <1 % . The patient will follow a protocol-derived staged meal progression supervised by the dietitian in the outpatient setting. Patient will follow-up with Dr. Blevins in 7-10 days, medical doctor and dietitian in 30 days. All appropriate [facility] from [facility] prior to discharge. Written discharge instructions explained and given to include postoperative complications, medications and side effect, diet and  VTE prevention. On 9/27/23 Ms Velazco who has a history of KENTON with CPAP, fatty liver, hypothyroid, obesity (BMI=33), underwent Laparoscopic Sleeve Gastrectomy. Bariatric ERAS protocol followed to include preoperative and perioperative use of DVT and SSI prophylaxis as well as multi-modal non-opioid analgesics. Patient tolerated the procedure well  extubated in the operating room then transferred to the PACU in stable condition. Once hemodynamically stable and effective pain control the patient was able to ambulate with assistance. Pt was also able to void within 4 hours following the procedure. The patient was later transferred to a bariatric unit and placed on bedside continuous pulse oximetry. Pain managment included IV multi-modal non-opioid analgesia then transitioned to liquid as needed.  Bariatric clear liquid diet started the day of surgery.    On POD #1 patient remained stable with no acute events overnight, has effective  pain control. Denies nausea and vomiting. Patient is ambulating independently and voiding as expected. The rest of the hospital course was uneventful and there were no post-operative complications identified.  Patient met [facility] Bariatric Surgery D/C criteria and subsequently cleared for discharge home on POD#1. 2 weeks LMWH prescribed for extended VTE prophylaxis, ([facility] VTE Risk Stratification Risk low, <1 %) . The patient will follow a protocol-derived staged meal progression supervised by the dietitian in the outpatient setting. Patient will follow-up with Dr. Blevins in 7-10 days, medical doctor and dietitian in 30 days. All appropriate prescriptions obtained from [facility] prior to discharge. Written discharge instructions explained and given to include postoperative complications, medications and side effect, diet and  VTE prevention

## 2023-09-28 NOTE — DIETITIAN INITIAL EVALUATION ADULT - PERTINENT LABORATORY DATA
09-28    137  |  103  |  14  ----------------------------<  96  4.5   |  20<L>  |  0.81    Ca    9.8      28 Sep 2023 07:15  Phos  3.7     09-27  Mg     2.0     09-27    A1C with Estimated Average Glucose Result: 5.3 % (09-18-23 @ 18:59)

## 2023-09-28 NOTE — DIETITIAN INITIAL EVALUATION ADULT - PERTINENT MEDS FT
MEDICATIONS  (STANDING):  heparin   Injectable 5000 Unit(s) SubCutaneous every 8 hours  hyoscyamine SL 0.125 milliGRAM(s) SubLingual every 6 hours  influenza   Vaccine 0.5 milliLiter(s) IntraMuscular once  ketorolac   Injectable 30 milliGRAM(s) IV Push every 6 hours  lactated ringers. 1000 milliLiter(s) (250 mL/Hr) IV Continuous <Continuous>  ondansetron Injectable 4 milliGRAM(s) IV Push every 6 hours  pantoprazole  Injectable 40 milliGRAM(s) IV Push every 24 hours  sodium chloride 0.9% 1000 milliLiter(s) (250 mL/Hr) IV Continuous <Continuous>    MEDICATIONS  (PRN):

## 2023-09-28 NOTE — DIETITIAN INITIAL EVALUATION ADULT - NSICDXPASTSURGICALHX_GEN_ALL_CORE_FT
PAST SURGICAL HISTORY:  S/P cholecystectomy     S/P correction of deviated nasal septum     S/P excision of lipoma     S/P tubal ligation

## 2023-09-28 NOTE — DIETITIAN INITIAL EVALUATION ADULT - REASON FOR ADMISSION
Morbid or severe obesity due to excess calories    Per chart, 53y old Female PMH KENTON with CPAP, fatty liver, hypothyroid, obesity (BMI=33) s/p 9/27 Laparoscopic sleeve gastrectomy and is recovering well.

## 2023-09-28 NOTE — DISCHARGE NOTE PROVIDER - CARE PROVIDER_API CALL
Jakub Blevins  Surgery  [address] [place] [address], [address]  [place] [address]  Phone: [phone]  Fax: [phone]  Follow Up Time: 2 weeks

## 2023-09-28 NOTE — PROGRESS NOTE ADULT - SUBJECTIVE AND OBJECTIVE BOX
Post Op Day#: 1    Subjective: "comfortable, no pain, I walked around by myself,     Objective: No acute events overnight, effective pain control, denies n/v. Tolerated bariatric liquid diet, (720ml +).  Continuous pulse oximetry at bedside functioning,  v/s stable, afebrile. Labs within acceptable range.  Ambulated independently around the unit w/o fatigue, weakness or dizziness.   Voiding as expected.                                               Vital Signs Last 24 Hrs  T(C): 36.6 (28 Sep 2023 13:16), Max: 36.7 (27 Sep 2023 23:55)  T(F): 97.9 (28 Sep 2023 13:16), Max: 98.1 (27 Sep 2023 23:55)  HR: 69 (28 Sep 2023 13:16) (65 - 84)  BP: 109/90 (28 Sep 2023 13:16) (96/54 - 130/67)  BP(mean): 72 (27 Sep 2023 21:00) (66 - 91)  RR: 18 (28 Sep 2023 13:16) (12 - 18)  SpO2: 96% (28 Sep 2023 13:16) (96% - 100%)    Parameters below as of 28 Sep 2023 13:16  Patient On (Oxygen Delivery Method): room air                                                   I&O's Summary    27 Sep 2023 07:01  -  28 Sep 2023 07:00  --------------------------------------------------------  IN: 2840 mL / OUT: 1150 mL / NET: 1690 mL    28 Sep 2023 07:01  -  28 Sep 2023 16:33  --------------------------------------------------------  IN: 600 mL / OUT: 1450 mL / NET: -850 mL                                                                          12.5   4.99  )-----------( 123      ( 28 Sep 2023 07:17 )             36.1                                                 09-28    137  |  103  |  14  ----------------------------<  96  4.5   |  20<L>  |  0.81    Ca    9.8      28 Sep 2023 07:15  Phos  3.7     09-27  Mg     2.0     09-27      heparin   Injectable 5000 Unit(s) SubCutaneous every 8 hours  hyoscyamine SL 0.125 milliGRAM(s) SubLingual every 6 hours  influenza   Vaccine 0.5 milliLiter(s) IntraMuscular once  lactated ringers. 1000 milliLiter(s) IV Continuous <Continuous>  ondansetron Injectable 4 milliGRAM(s) IV Push every 6 hours  pantoprazole  Injectable 40 milliGRAM(s) IV Push every 24 hours  sodium chloride 0.9% 1000 milliLiter(s) IV Continuous <Continuous>      Physical Exam:         Lungs:  clear breath sounds b/l       Heart:  Regular rate & rhythm       Abdomen:  Soft, non-distended.  Scopes sites clean, dry and intact. + bs, - flatus, no rebound or guarding       Skin:  intact, pannus w/o rash       Extremities: + pulses, no edema, no calf tenderness, negative juan david's     VTE Extended Risk Assessment Scores             [facility]  VTE Predicted RISK low, (<1%):       Assessment and Plan: 53 year old obese female, BMI 33.3 who underwent lap sleeve gastrectomy on 9/28/2023, POD# 1 stable    - Bariatric Clear diet today then protocol derived staged meal progression supervised by [facility]YS in outpatient setting  - DVT - LMWH x 14 days  (patient education with teach back). GI prophylaxis, Incentive spirometry  - Ambulate as tolerated  - Procedure specific education including postop complications, medications and side effects, diet, vitamins and VTE prevention, written materials given  - Medication reviewed and reconciled, Bariatric meds obtained from [facility] prior to d/c  - D/C home bariatric 8-Point d/c criteria met  - Follow up with Dr Melissa in 7-10 days, Dietitian and PMD in 30 days.      Delilah James, LEN, ANP  [phone]

## 2023-09-28 NOTE — PHARMACOTHERAPY INTERVENTION NOTE - COMMENTS
HPI:  54yo female with PMH KENTON with CPAP, fatty liver, hypothyroid, and obesity (BMI=33).  S/p vertical sleeve gastrectomy on 27 Sept 2023.  Patient medication reconciliation completed. Patient currently taking:     Home Medications:  biotin 1tab  PO daily  Multiple Vitamins oral tablet: 1 tab(s) orally once a day  Synthroid 75 mcg (0.075 mg) oral tablet: 1 tab(s) orally once a day    Patient was instructed to use crushed, dissolvable, chewable, or liquid formulations of medications for 1 month after surgery. Patient was informed to take daily multivitamins post surgically. Patient reeducated on NSAID avoidance (ibuprofen, ASA, naproxen, aleve) as they increase risk of GI bleeding; may use APAP for mild pain otherwise contact prescriber for consult. Patient was informed on indications and directions for administration for acetaminophen liquid, hyoscyamine SL, ondansetron ODT, lovenox SQ, and omeprazole DR.    Patient was instructed to take the medications as follows:  Continue vitamins/supplements in chewable/dissolvable forms (MVI, Biotin)  Crush Synthroid tablets    Lori Fabian, LexusD, BCPS  Available on [employer] Teams

## 2023-09-28 NOTE — DISCHARGE NOTE NURSING/CASE MANAGEMENT/SOCIAL WORK - PATIENT PORTAL LINK FT
You can access the FollowMyHealth Patient Portal offered by [facility] by registering at the following website: [email or web address]. By joining [facility]’s FollowMyHealth portal, you will also be able to view your health information using other applications (apps) compatible with our system.

## 2023-09-28 NOTE — DISCHARGE NOTE PROVIDER - NSDCFUSCHEDAPPT_GEN_ALL_CORE_FT
Jakub Blevins  [facility] Physician Formerly [facility]  GENSUR [address]  Scheduled Appointment: 10/13/2023    Zak Ramírez  [address]  Scheduled Appointment: 10/31/2023

## 2023-09-28 NOTE — DIETITIAN INITIAL EVALUATION ADULT - ENERGY INTAKE
Pt now S/P laparoscopic vertical sleeve gastrectomy. Pt denies N+V, sipping on bariatric clear liquids during RD visit. Pt with knowledge of bariatric full liquid diet and receptive to in depth review/reinforcement. Pt reports home stock of protein shakes with plans to purchase more. Pt reports plan to purchase the necessary vitamins/minerals in chewable/liquid/crushable form including a multivitamin with added elemental iron, calcium citrate with vitamin D, vitamin C, thiamine and sublingual B12. Pt was advised to take the multivitamin with elemental iron at least 2 hours apart from the calcium citrate with vitamin D for optimal absorption. Pt plans to schedule a follow up appointment with outpatient RD. Pt able to teach back all points discussed during interview.

## 2023-09-28 NOTE — DIETITIAN INITIAL EVALUATION ADULT - PERSON TAUGHT/METHOD
1) Laparoscopic sleeve gastrectomy nutrition recommendations reviewed/reinforced (discharge instruction handout referenced).  Including vitamin/supplement compliance as instructed by team, full liquid diet items reviewed, sugar-free, low-fat, no straws, no carbonated beverages, 6 small meals per day, sip slowly: 1 ounce servings every 15-20 minutes as tolerated, no water during meals-drink water 1 hr prior to or after meals, adequate hydration and adequate protein emphasized. Pt encouraged to follow-up with outpatient RD.  2) RD to remain available to reinforce nutrition education as requested by pt./verbal instruction/written material/teach back - (Patient repeats in own words)/patient instructed

## 2023-09-28 NOTE — DIETITIAN INITIAL EVALUATION ADULT - OTHER INFO
Pt with multiple previous wt loss attempts per chart and was unable to lose and maintain significant wt loss. Per chart, pt met with outpatient RD and weighed 174 lbs (07-08). Pre-surgical wt (H&P) noted as 171.9 lbs (07-19). Current wt of 170.4 lbs (09-27).

## 2023-09-28 NOTE — DISCHARGE NOTE PROVIDER - NSDCCPCAREPLAN_GEN_ALL_CORE_FT
PRINCIPAL DISCHARGE DIAGNOSIS  Diagnosis: Class 1 obesity with serious comorbidity in adult  Assessment and Plan of Treatment: anagelsia, liquid dietary supplement, follow up care increase physical activity.  Remove outer plastics dressing tomorrow - underneath are little white bandaids called steristrips let soap/water run over and pat dry after showering, steri strips will fall off on own in 1-2 weeks  please follow up with Dr. Blevins in 2 weeks   Notfiy Dr. Blevins if develop fever, chills, worsening abdominal pain, nausea/vomiting, puruelent drainage from wound

## 2023-09-28 NOTE — PROGRESS NOTE ADULT - SUBJECTIVE AND OBJECTIVE BOX
Overnight: POC completed.    SUBJECTIVE: Patient seen and examined on AM rounds. Patient denies any complaints. States pain is well controlled. Tolerating bariatric clears without nausea or vomiting. - Flatus. - BM. Voiding appropriately. Ambulating well. Denies fevers, chills, SOB, CP.      Vital Signs Last 24 Hrs  T(C): 36.7 (27 Sep 2023 23:55), Max: 36.7 (27 Sep 2023 23:55)  T(F): 98.1 (27 Sep 2023 23:55), Max: 98.1 (27 Sep 2023 23:55)  HR: 72 (28 Sep 2023 00:33) (63 - 89)  BP: 121/55 (27 Sep 2023 23:55) (80/46 - 130/67)  BP(mean): 72 (27 Sep 2023 21:00) (59 - 91)  RR: 18 (27 Sep 2023 23:55) (12 - 18)  SpO2: 98% (28 Sep 2023 00:33) (91% - 100%)    Parameters below as of 27 Sep 2023 23:55  Patient On (Oxygen Delivery Method): room air        I&O's Detail    27 Sep 2023 07:01  -  28 Sep 2023 01:15  --------------------------------------------------------  IN:    sodium chloride 0.9% w/ Additives: 1250 mL  Total IN: 1250 mL    OUT:    Voided (mL): 1150 mL  Total OUT: 1150 mL    Total NET: 100 mL      Physical Exam  General: sitting in chair, nad, well appearing  CV: rrr  resp: normal work of breathing  Abdomen: soft, appropriately tender near incision sites, nondistended, incision sites covered with steris with minimal strikethrough    LABS:                        12.3   4.21  )-----------( 106      ( 27 Sep 2023 15:54 )             35.7     09-27    137  |  103  |  14  ----------------------------<  92  4.1   |  21<L>  |  0.83    Ca    9.0      27 Sep 2023 15:54  Phos  3.7     09-27  Mg     2.0     09-27        Urinalysis Basic - ( 27 Sep 2023 15:54 )    Color: x / Appearance: x / SG: x / pH: x  Gluc: 92 mg/dL / Ketone: x  / Bili: x / Urobili: x   Blood: x / Protein: x / Nitrite: x   Leuk Esterase: x / RBC: x / WBC x   Sq Epi: x / Non Sq Epi: x / Bacteria: x        RADIOLOGY & ADDITIONAL STUDIES:   Overnight: POC completed.    SUBJECTIVE: Patient seen and examined on AM rounds. Pt endorsing burping overnight and sharp epigastric pain. Tolerating bariatric clears without nausea or vomiting. - Flatus. - BM. Voiding appropriately. Ambulating well. Denies fevers, chills, SOB, CP.      Vital Signs Last 24 Hrs  T(C): 36.7 (27 Sep 2023 23:55), Max: 36.7 (27 Sep 2023 23:55)  T(F): 98.1 (27 Sep 2023 23:55), Max: 98.1 (27 Sep 2023 23:55)  HR: 72 (28 Sep 2023 00:33) (63 - 89)  BP: 121/55 (27 Sep 2023 23:55) (80/46 - 130/67)  BP(mean): 72 (27 Sep 2023 21:00) (59 - 91)  RR: 18 (27 Sep 2023 23:55) (12 - 18)  SpO2: 98% (28 Sep 2023 00:33) (91% - 100%)    Parameters below as of 27 Sep 2023 23:55  Patient On (Oxygen Delivery Method): room air        I&O's Detail    27 Sep 2023 07:01  -  28 Sep 2023 01:15  --------------------------------------------------------  IN:    sodium chloride 0.9% w/ Additives: 1250 mL  Total IN: 1250 mL    OUT:    Voided (mL): 1150 mL  Total OUT: 1150 mL    Total NET: 100 mL      Physical Exam  General: sitting in chair, nad, well appearing  CV: rrr  resp: normal work of breathing  Abdomen: soft, mild LUQ TTP, nondistended, incision sites covered with steris with minimal strikethrough    LABS:                        12.3   4.21  )-----------( 106      ( 27 Sep 2023 15:54 )             35.7     09-27    137  |  103  |  14  ----------------------------<  92  4.1   |  21<L>  |  0.83    Ca    9.0      27 Sep 2023 15:54  Phos  3.7     09-27  Mg     2.0     09-27        Urinalysis Basic - ( 27 Sep 2023 15:54 )    Color: x / Appearance: x / SG: x / pH: x  Gluc: 92 mg/dL / Ketone: x  / Bili: x / Urobili: x   Blood: x / Protein: x / Nitrite: x   Leuk Esterase: x / RBC: x / WBC x   Sq Epi: x / Non Sq Epi: x / Bacteria: x        RADIOLOGY & ADDITIONAL STUDIES:

## 2023-09-28 NOTE — DIETITIAN INITIAL EVALUATION ADULT - ORAL INTAKE PTA/DIET HISTORY
Pt reports following a full liquid diet for 2 weeks PTA as instructed by outpatient RD. Pt reports having lentil soup, egg drop soup, Premier protein shakes, SF jello,  Gatorade, and chicken broth PTA.  Confirms NKFA.

## 2023-10-09 LAB — SURGICAL PATHOLOGY STUDY: SIGNIFICANT CHANGE UP

## 2023-10-13 ENCOUNTER — APPOINTMENT (OUTPATIENT)
Dept: SURGERY | Facility: CLINIC | Age: 53
End: 2023-10-13
Payer: COMMERCIAL

## 2023-10-13 VITALS
TEMPERATURE: 96.7 F | DIASTOLIC BLOOD PRESSURE: 77 MMHG | HEART RATE: 93 BPM | OXYGEN SATURATION: 98 % | SYSTOLIC BLOOD PRESSURE: 112 MMHG | RESPIRATION RATE: 18 BRPM | HEIGHT: 60 IN | BODY MASS INDEX: 30.63 KG/M2 | WEIGHT: 156 LBS

## 2023-10-13 PROCEDURE — 99024 POSTOP FOLLOW-UP VISIT: CPT

## 2023-10-31 ENCOUNTER — APPOINTMENT (OUTPATIENT)
Dept: PULMONOLOGY | Facility: CLINIC | Age: 53
End: 2023-10-31
Payer: COMMERCIAL

## 2023-10-31 VITALS
RESPIRATION RATE: 16 BRPM | HEIGHT: 60 IN | OXYGEN SATURATION: 93 % | TEMPERATURE: 97.2 F | BODY MASS INDEX: 30.04 KG/M2 | SYSTOLIC BLOOD PRESSURE: 120 MMHG | HEART RATE: 51 BPM | DIASTOLIC BLOOD PRESSURE: 68 MMHG | WEIGHT: 153 LBS

## 2023-10-31 PROCEDURE — 94010 BREATHING CAPACITY TEST: CPT

## 2023-10-31 PROCEDURE — 99214 OFFICE O/P EST MOD 30 MIN: CPT | Mod: 25

## 2023-11-10 ENCOUNTER — RX RENEWAL (OUTPATIENT)
Age: 53
End: 2023-11-10

## 2023-12-01 ENCOUNTER — APPOINTMENT (OUTPATIENT)
Dept: SURGERY | Facility: CLINIC | Age: 53
End: 2023-12-01
Payer: COMMERCIAL

## 2023-12-01 VITALS
DIASTOLIC BLOOD PRESSURE: 69 MMHG | OXYGEN SATURATION: 95 % | WEIGHT: 144 LBS | RESPIRATION RATE: 17 BRPM | HEART RATE: 68 BPM | HEIGHT: 60 IN | TEMPERATURE: 97 F | BODY MASS INDEX: 28.27 KG/M2 | SYSTOLIC BLOOD PRESSURE: 110 MMHG

## 2023-12-01 PROCEDURE — 99024 POSTOP FOLLOW-UP VISIT: CPT

## 2023-12-15 ENCOUNTER — RX RENEWAL (OUTPATIENT)
Age: 53
End: 2023-12-15

## 2024-01-08 ENCOUNTER — APPOINTMENT (OUTPATIENT)
Dept: ENDOCRINOLOGY | Facility: CLINIC | Age: 54
End: 2024-01-08
Payer: COMMERCIAL

## 2024-01-08 VITALS
TEMPERATURE: 97.2 F | HEART RATE: 72 BPM | OXYGEN SATURATION: 98 % | RESPIRATION RATE: 17 BRPM | DIASTOLIC BLOOD PRESSURE: 87 MMHG | BODY MASS INDEX: 26.5 KG/M2 | SYSTOLIC BLOOD PRESSURE: 125 MMHG | HEIGHT: 60 IN | WEIGHT: 135 LBS

## 2024-01-08 DIAGNOSIS — E04.2 NONTOXIC MULTINODULAR GOITER: ICD-10-CM

## 2024-01-08 PROCEDURE — 99214 OFFICE O/P EST MOD 30 MIN: CPT

## 2024-01-08 NOTE — PROCEDURE
[RealCrowd e 2008 model, 10-12 MHz frequencies] : multiple real time longitudinal and transverse images were obtained using a high resolution ultrasound with a linear transducer, RealCrowd e 2008 model, 10-12 MHz frequencies. All measurements will be reported as longitudinal x dwayne-posterior x transverse. [Report dated ___] : Report dated [unfilled] [Multinodular Goiter] : multinodular goiter [] : pseudomacronodular architecture [There are no distinct nodules visualized.] : There are no distinct nodules visualized. [FreeTextEntry1] : 4.50 x 1.98 x 2.19 [FreeTextEntry5] : 4.82 x 2.43 x 2.24 [FreeTextEntry2] : 0.86

## 2024-01-08 NOTE — HISTORY OF PRESENT ILLNESS
[FreeTextEntry1] : 54 y/o F hx of hypothyroidism since age 8, seen initially by me 2014, was supposed to be on levothyroxine 100 mcg, but not taking it. Never got the prescription and forgot to take. Returned for follow-up  back on levothyroxine 100 mcg daily with TSH 0.32 Free T4 1.5 (10/2016) from 1.10 (2015). Seen by cardiology with palpitations. Levothyroxine decreased to 88 mcg. Remained chemically hyperthyroid until 2020 with some symptoms of anxiety and palpitations and TSH of 0.08. Recommended decrease dose to 75 mcg. Has been chemically euthyroid since then on this dose. Has lost weight doing Joyce now s/p gastric sleeve with Dr. Blevins 2023. Feeling much better with less palpitations, improvement in fatigue, hot flashes and sleep apnea. Adherent to levothyroxine. Was chemically euthyroid with recent labs by PCP 2023.  +Mother and grandmother with hx of hypothyroidism.  LMP .  . No hx of thyroid d/o during pregnancy. Worked up by Dr. Bond unclear reason for premature menopause. Recommended Premarin, but did not take it.   Noticed enlargement of neck. Hx of thyroid U/S with multinodular goiter. With FNA of 1 nodule  which was benign. Last thyroid U/S 2022 with heterogenous thyroid with pseudo nodules. No dysphasia, no dysphonia, no orthopnea. Reports improved snoring. Referred or sleep study with evidence of moderate sleep apnea recommended CPAP, but has not needed to use it. Now improved s/p weight loss. No hx of childhood radiation exposure. Last DXA 2016 with osteopenia.  +constipation now on iron supplementation s/p gastric sleeve.

## 2024-01-08 NOTE — DATA REVIEWED
[FreeTextEntry1] : Focused Thyroid US 1/2023: Diffusely enlarged heterogenous gland with multiple pseudo nodules and 1.2 cm left lower nodule.   Labs 9/2023: CMP normal except ALKP 138 Vit D 36.9 TSH 3.75 A1c 5.3%  Labs 6/2023: LDL 80 TSH 2.43  Thyroid Ultrasound Report 6/22/2022:  Technique: multiple real time longitudinal and transverse images were obtained using a high resolution ultrasound with a linear transducer, CreaWor e 2008 model, 10-12 MHz frequencies. All measurements will be reported as longitudinal x dwayne-posterior x transverse.  Comparison: Report dated 8/2015.   Indication: multinodular goiter.   Findings:  The right thyroid lobe measures 4.6 x 2.42 x 2.25 cm. The left thyroid lobe measures 4.8 x 2.57 x 2.32 cm. The isthmus measures 0.92 cm.  The right thyroid lobe has a heterogeneous parenchyma. It has multiple ill defined areas of hypoechogenicity and pseudomacronodular architecture.  The left thyroid lobe has a heterogeneous parenchyma . It has multiple ill defined areas of hypoechogenicity and pseudomacronodular architecture.   There are no distinct nodules visualized.   Labs 1/2022: Chemically euthyroid  Labs 6/2020: TSH 0.08 Free T4 1.4 LFTs normal  DXA 11/2016: Spine -1.6 Femoral Neck -1.4  Labs 10/2016: TSH 0.32 Free T4 1.5 TPO Ab + CBC normal CMP normal except AlkP of 124 LDL 40 HDL 50 Chol 158 Trig 342 A1c 5.3%  Thyroid Ultrasound Report 10/19/16:  Technique: multiple real time longitudinal and transverse images were obtained using a high resolution ultrasound with a linear transducer, CreaWor e 2008 model, 10-12 MHz frequencies. All measurements will be reported as longitudinal x dwayne-posterior x transverse.  Comparison: Report dated 8/2015.   Indication: multinodular goiter.   Findings:  The right thyroid lobe measures 4.50 x 1.98 x 2.19 cm. The left thyroid lobe measures 4.82 x 2.43 x 2.24 cm. The isthmus measures 0.86 cm.  The right thyroid lobe has a heterogeneous parenchyma. It has multiple ill defined areas of hypoechogenicity and pseudomacronodular architecture.  The left thyroid lobe has a heterogeneous parenchyma . It has multiple ill defined areas of hypoechogenicity and pseudomacronodular architecture.   There are no distinct nodules visualized.    Labs 8/2015: TSH 1.10  Thyroid Ultrasound Report 4/24/15: Technique: Multiple real time longitudinal and transverse images were obtained using a high resolution ultrasound with a linear transducer, CreaWor e 2008 model, 10-12 MHz frequencies. All measurements will be reported as longitudinal x dwayne-posterior x transverse.  Comparison: Report dated 8/2014.   Indication: multinodular goiter.   Findings:  The right thyroid lobe measures 4.81 x 2.02 x 1.93 cm. The left thyroid lobe measures 4.82 x 2.24 x 2.64 cm. The isthmus measures 0.80 cm.  The right thyroid lobe has a heterogeneous parenchyma. It has multiple ill defined areas of hypoechogenicity and pseudomacronodular architecture.  The left thyroid lobe has a heterogeneous parenchyma . It has multiple ill defined areas of hypoechogenicity and pseudomacronodular architecture.   There are no distinct nodules visualized.   Labs 4/21/15: TSH 1.35 Free T4 1.1 TPO Ab +  Prolactin 19.8 TSH 5.9 (2012)  Thyroid U/S performed today in office 8/6/14- Bilaterally enlarged heterogenous gland with nodularity. 1 discrete nodule in right lower lobe measuring 0.85x1.16x1.19 hypoechoic, no internal vascularity or calcifications, regular margins.  Isthmus measures 0.83cm, Right Lobe 2.24x2.16x3.93, Left Lobe 2.37x2.43x4.62. Appears consistent with Hashimoto's.

## 2024-01-08 NOTE — ASSESSMENT
[FreeTextEntry1] : 54 y/o F w/   1.Hypothyroidism- Likely Hashimoto's given family hx and ultrasound findings. decreased levothyroxine to 75 mcg daily in 2020. Clinically and chemically euthyroid with last TFTs 9/2023 by PCP. Advised pt. to take levothyroxine daily in am on empty stomach at least 30 min prior to breakfast.    2. Multinodular Goiter- consistent with Hashimoto's. Thyroid U/S performed 10/2016 (see results section for full description). She currently has no symptoms of tracheal compression or obstruction. Appears to be more of a cosmetic concern. Last Thyroid US 6/2022. Will continue to monitor. Will have patient send prior ultrasound reports and pathology report from prior evaluation of thyroid nodules.    3. KENTON- now improved with weight loss s/p gastric sleeve 9/2023.     4. Elevated Liver Function Tests- hx of hepatitis. Possible NAFLD. Follow-up with hepatology.    Discussion with patient regarding thyroid hormone regimen, KENTON, and multinodular goiter with management plan, risks and benefits, treatment options and goals of care answering all patients questions and addressing all concerns  Post-visit completion charting and review  Total Time 30 min    Specifically causes, evaluation, treatment options, risks, complications, and benefits of available therapies were discussed. Questions were answered.    The submitted E/M billing level for this visit reflects the total time spent on the day of the visit including face-to-face time spent with the patient, non-face-to-face review of medical records and relevant information, documentation, and asynchronous communication with the patient after a visit via phone, email, or patient's EHR portal after the visit.  The medical records reviewed are either scanned into the chart or reviewed with the patient using a patient's electronic medical records portal for patients with records not available to [facility] via electronic transmission platforms from other institutions and labs.  Time spend counseling and performing coordination of care was also included in determining the appropriate EM billing level.    I have reviewed and verified information regarding the chief complaint and history recorded by the ancillary staff and/or the patient. I have independently reviewed and interpreted tests performed by other physicians and facilities as necessary.    I have discussed with the patient differential diagnosis, reason for auxiliary tests if ordered, risks, benefits, alternatives, and complications of each form of therapy were discussed.

## 2024-02-13 NOTE — H&P PST ADULT - PRO ARRIVE FROM
Pt had bobo argueta called this shift for low BP's ranging from 60/40's-80/50's, pt symptomatic w/ lethargy, dizziness, and lightheadedness. Pt received 500 ml fluid bolus. Pt's remains inpatient for continuous monitoring of VS and heart rhythm. Pt still complaining of pain w/ L foot - receiving oxycodone PRN.    Problem: Pain  Goal: My pain/discomfort is manageable  Outcome: Progressing     Problem: Safety  Goal: Patient will be injury free during hospitalization  Outcome: Progressing  Goal: I will remain free of falls  Outcome: Progressing     Problem: Daily Care  Goal: Daily care needs are met  Outcome: Progressing     Problem: Skin  Goal: Decreased wound size/increased tissue granulation at next dressing change  Outcome: Progressing  Goal: Participates in plan/prevention/treatment measures  Outcome: Progressing  Goal: Prevent/manage excess moisture  Outcome: Progressing  Goal: Prevent/minimize sheer/friction injuries  Outcome: Progressing  Goal: Promote/optimize nutrition  Outcome: Progressing  Goal: Promote skin healing  Outcome: Progressing     Problem: Diabetes  Goal: Achieve decreasing blood glucose levels by end of shift  Outcome: Progressing  Goal: Increase stability of blood glucose readings by end of shift  Outcome: Progressing  Goal: Decrease in ketones present in urine by end of shift  Outcome: Progressing  Goal: Maintain electrolyte levels within acceptable range throughout shift  Outcome: Progressing  Goal: Maintain glucose levels >70mg/dl to <250mg/dl throughout shift  Outcome: Progressing  Goal: No changes in neurological exam by end of shift  Outcome: Progressing  Goal: Learn about and adhere to nutrition recommendations by end of shift  Outcome: Progressing  Goal: Vital signs within normal range for age by end of shift  Outcome: Progressing  Goal: Increase self care and/or family involovement by end of shift  Outcome: Progressing  Goal: Receive DSME education by end of shift  Outcome: Progressing      Problem: Heart Failure  Goal: Improved gas exchange this shift  Outcome: Progressing  Goal: Improved urinary output this shift  Outcome: Progressing  Goal: Reduction in peripheral edema within 24 hours  Outcome: Progressing  Goal: Report improvement of dyspnea/breathlessness this shift  Outcome: Progressing  Goal: Weight from fluid excess reduced over 2-3 days, then stabilize  Outcome: Progressing  Goal: Increase self care and/or family involvement in 24 hours  Outcome: Progressing     Problem: Pain  Goal: Takes deep breaths with improved pain control throughout the shift  Outcome: Progressing  Goal: Turns in bed with improved pain control throughout the shift  Outcome: Progressing  Goal: Walks with improved pain control throughout the shift  Outcome: Progressing  Goal: Performs ADL's with improved pain control throughout shift  Outcome: Progressing  Goal: Participates in PT with improved pain control throughout the shift  Outcome: Progressing  Goal: Free from opioid side effects throughout the shift  Outcome: Progressing  Goal: Free from acute confusion related to pain meds throughout the shift  Outcome: Progressing     Problem: Pain - Adult  Goal: Verbalizes/displays adequate comfort level or baseline comfort level  Outcome: Progressing     Problem: Safety - Adult  Goal: Free from fall injury  Outcome: Progressing     Problem: Discharge Planning  Goal: Discharge to home or other facility with appropriate resources  Outcome: Progressing     Problem: Chronic Conditions and Co-morbidities  Goal: Patient's chronic conditions and co-morbidity symptoms are monitored and maintained or improved  Outcome: Progressing        home

## 2024-02-16 ENCOUNTER — APPOINTMENT (OUTPATIENT)
Dept: SURGERY | Facility: CLINIC | Age: 54
End: 2024-02-16

## 2024-02-23 ENCOUNTER — APPOINTMENT (OUTPATIENT)
Dept: SURGERY | Facility: CLINIC | Age: 54
End: 2024-02-23
Payer: COMMERCIAL

## 2024-02-23 VITALS
BODY MASS INDEX: 25.35 KG/M2 | HEIGHT: 60 IN | HEART RATE: 76 BPM | DIASTOLIC BLOOD PRESSURE: 75 MMHG | SYSTOLIC BLOOD PRESSURE: 109 MMHG | WEIGHT: 129.13 LBS | OXYGEN SATURATION: 99 % | TEMPERATURE: 97.8 F | RESPIRATION RATE: 18 BRPM

## 2024-02-23 DIAGNOSIS — E66.01 MORBID (SEVERE) OBESITY DUE TO EXCESS CALORIES: ICD-10-CM

## 2024-02-23 PROCEDURE — 99214 OFFICE O/P EST MOD 30 MIN: CPT

## 2024-03-13 ENCOUNTER — APPOINTMENT (OUTPATIENT)
Dept: PULMONOLOGY | Facility: CLINIC | Age: 54
End: 2024-03-13

## 2024-04-03 ENCOUNTER — APPOINTMENT (OUTPATIENT)
Dept: PULMONOLOGY | Facility: CLINIC | Age: 54
End: 2024-04-03
Payer: COMMERCIAL

## 2024-04-03 VITALS
HEIGHT: 60 IN | WEIGHT: 124.06 LBS | TEMPERATURE: 97.4 F | BODY MASS INDEX: 24.36 KG/M2 | DIASTOLIC BLOOD PRESSURE: 70 MMHG | OXYGEN SATURATION: 97 % | RESPIRATION RATE: 18 BRPM | SYSTOLIC BLOOD PRESSURE: 110 MMHG | HEART RATE: 72 BPM

## 2024-04-03 DIAGNOSIS — J30.89 OTHER ALLERGIC RHINITIS: ICD-10-CM

## 2024-04-03 DIAGNOSIS — G47.33 OBSTRUCTIVE SLEEP APNEA (ADULT) (PEDIATRIC): ICD-10-CM

## 2024-04-03 DIAGNOSIS — R06.02 SHORTNESS OF BREATH: ICD-10-CM

## 2024-04-03 DIAGNOSIS — K21.9 GASTRO-ESOPHAGEAL REFLUX DISEASE W/OUT ESOPHAGITIS: ICD-10-CM

## 2024-04-03 DIAGNOSIS — J45.909 UNSPECIFIED ASTHMA, UNCOMPLICATED: ICD-10-CM

## 2024-04-03 PROCEDURE — ZZZZZ: CPT

## 2024-04-03 PROCEDURE — 94729 DIFFUSING CAPACITY: CPT

## 2024-04-03 PROCEDURE — 94727 GAS DIL/WSHOT DETER LNG VOL: CPT

## 2024-04-03 PROCEDURE — 94010 BREATHING CAPACITY TEST: CPT

## 2024-04-03 PROCEDURE — 99214 OFFICE O/P EST MOD 30 MIN: CPT | Mod: 25

## 2024-04-03 NOTE — REASON FOR VISIT
[Follow-Up] : a follow-up visit [TextBox_44] : SOB, Mild Asthma, Allergies, GERD, KENTON, s/p bariatric surgery 9/2023

## 2024-04-03 NOTE — ADDENDUM
[FreeTextEntry1] : Documented by Ese Caballero acting as a scribe for Dr. Zak Ramírez on 04/03/2024. All medical record entries made by the Scribe were at my, Dr. Zak Ramírez's, direction and personally dictated by me on 04/03/2024. I have reviewed the chart and agree that the record accurately reflects my personal performance of the history, physical exam, assessment and plan. I have also personally directed, reviewed, and agree with the discharge instructions.

## 2024-04-03 NOTE — PHYSICAL EXAM
[No Acute Distress] : no acute distress [Normal Oropharynx] : normal oropharynx [Normal Appearance] : normal appearance [No Neck Mass] : no neck mass [Normal S1, S2] : normal s1, s2 [Normal Rate/Rhythm] : normal rate/rhythm [No Murmurs] : no murmurs [Clear to Auscultation Bilaterally] : clear to auscultation bilaterally [No Resp Distress] : no resp distress [Benign] : benign [No Abnormalities] : no abnormalities [Normal Gait] : normal gait [No Clubbing] : no clubbing [No Cyanosis] : no cyanosis [FROM] : FROM [No Edema] : no edema [Normal Color/ Pigmentation] : normal color/ pigmentation [No Focal Deficits] : no focal deficits [Oriented x3] : oriented x3 [Normal Affect] : normal affect [II] : Mallampati Class: II [TextBox_68] : I:E ratio 1:3; clear

## 2024-04-03 NOTE — HISTORY OF PRESENT ILLNESS
[TextBox_4] : Ms. JACK  is a 54 year old female presenting to the office today for a follow-up pulmonary evaluation. Her chief complaint is   -she notes s/p trip to [place] for 3 weeks. She worked remotely -she notes exercising (walking, weight training at the gym with her ) without limitations -she notes she's lost weight, and she's eating in moderation. She doesn't eat red meat. She's 62 lbs down s/p bariatric surgery 9/2023 -she notes GERD is controlled -she notes sleeping for 7 hours, uninterrupted -she denies allergy Sx at this time -she denies taking any new medications, vitamins, or supplements  -she notes she's on synthroid -she denies snoring  -she denies any headaches, nausea, emesis, fever, chills, sweats, chest pain, chest pressure, coughing, wheezing, palpitations, diarrhea, constipation, dysphagia, vertigo, arthralgias, myalgias, leg swelling, itchy eyes, itchy ears, heartburn, reflux, or sour taste in the mouth.

## 2024-04-03 NOTE — ASSESSMENT
[FreeTextEntry1] : Ms. JACK is a 54 year old female with a history of hypothyroid, thyroid goiter, COVID-19 x2 (6/2020, 2021), fatty liver, high cholesterol, asthma, GERD, KENTON, allergies, ow- SOB, Mild Asthma, Allergies, GERD, KENTON, s/p bariatric surgery 9/2023- improved with 62 lbs weight loss- thriving  Her shortness of breath is multifactorial due to: -poor mechanics of breathing -out of shape -pulmonary disease  - Mild Intermittent Asthma (Seasonal) -cardiac disease ([place])  problem 1: Mild Intermittent Asthma (Seasonal)- controlled -Ventolin 2 puffs Q6H, if sick -Symbicort 160 2 inhalations BID PRN -Inhaler technique reviewed as well as oral hygiene techniques reviewed with patient. Avoidance of cold air, extremes of temperature, rescue inhaler should be used before exercise. Order of medication reviewed with patient. Recommended use of a cool mist humidifier in the bedroom. - Asthma is believed to be caused by inherited (genetic) and environmental factor, but its exact cause is unknown. - Asthma may be triggered by allergens, lung infections, or irritants in the air. Asthma triggers are different for each person  problem 2: Allergies / Sinus- quiet -get Blood work to include: asthma panel, food IgE panel, IgE level, eosinophil level, vitamin D level, alpha 1 antitrypsin levels (NC) -Olopatadine 0.6% 1 sniff BID Environmental measures for allergies were encouraged including mattress and pillow covers, air purifier, and environmental controls.  Problem 3: GERD- controlled -continue Pantoprazole 40 mg QAM -Rule of 2s: avoid eating too much, eating too late, eating too spicy, eating two hours before bed. -Things to avoid including overeating, spicy foods, tight clothing, eating within three hours of bed, this list is not all inclusive. -For treatment of reflux, possible options discussed including diet control, H2 blockers, PPIs, as well as coating motility agents discussed as treatment options. Timing of meals and proximity of last meal to sleep were discussed. If symptoms persist, a formal gastrointestinal evaluation is needed.  Problem 4: Moderate KENTON (AHI = 18.3) - (Risk Factors: elevated Mallampati class and thyroid goiter) -complete repeat home sleep study once she reaches her goal weight -CPAP 13 cm Dreamwear Nasal Mask Small in place Sleep apnea is associated with adverse clinical consequences which can affect most organ systems. Cardiovascular disease risk includes arrhythmias, atrial fibrillation, hypertension, coronary artery disease, and stroke. Metabolic disorders include diabetes type 2, non-alcoholic fatty liver disease. Mood disorder especially depression; and cognitive decline especially in the elderly. Associations with chronic reflux/Barretts esophagus some but not all inclusive. -Reasons include arousal consistent with hypopnea; respiratory events most prominent in REM sleep or supine position; therefore sleep staging and body position are important for accurate diagnosis and estimation of AHI.  problem 5: cardiac disease -recommended to continue to follow up with Cardiologist ([place])  problem 6: poor breathing mechanics -Proper breathing techniques were reviewed with an emphasis of exhalation. Patient instructed to breath in for 1 second and out for four seconds. Patient was encouraged to not talk while walking.  problem 7: overweight/ out of shape s/p bariatric surgery 9/2023- resolved (62 lbs down) -Weight loss, exercise, and diet control were discussed and are highly encouraged. Treatment options are given such as, aqua therapy, and contacting a nutritionist. Recommended to use the elliptical, stationary bike, less use of treadmill.  problem 8: health maintenance -recommended yearly flu shot after October 15 -recommended strep pneumonia vaccines: Prevnar-20 vaccine, followed by Pneumo vaccine 23 one year following after 65 years old. -recommended early intervention for Upper Respiratory Infections (URIs) -recommended regular osteoporosis evaluations -recommended early dermatological evaluations -recommended after the age of 50 to the age of 70, colonoscopy every 5 years  F/P in 4-6 months  She is encouraged to call with any changes, concerns, or questions

## 2024-04-03 NOTE — PROCEDURE
[FreeTextEntry1] : Full PFT reveals normal flows; FEV1 was 2.33L which is 106% of predicted; normal lung volumes; normal diffusion at 23.3, which is 109% of predicted; normal flow volume loop. PFTs were performed to evaluate for SOB

## 2024-06-10 ENCOUNTER — NON-APPOINTMENT (OUTPATIENT)
Age: 54
End: 2024-06-10

## 2024-06-10 ENCOUNTER — LABORATORY RESULT (OUTPATIENT)
Age: 54
End: 2024-06-10

## 2024-06-10 ENCOUNTER — APPOINTMENT (OUTPATIENT)
Dept: CARDIOLOGY | Facility: CLINIC | Age: 54
End: 2024-06-10
Payer: COMMERCIAL

## 2024-06-10 VITALS
OXYGEN SATURATION: 99 % | SYSTOLIC BLOOD PRESSURE: 118 MMHG | HEIGHT: 60 IN | TEMPERATURE: 97.9 F | DIASTOLIC BLOOD PRESSURE: 78 MMHG | RESPIRATION RATE: 16 BRPM | WEIGHT: 120 LBS | BODY MASS INDEX: 23.56 KG/M2 | HEART RATE: 62 BPM

## 2024-06-10 DIAGNOSIS — R01.1 CARDIAC MURMUR, UNSPECIFIED: ICD-10-CM

## 2024-06-10 DIAGNOSIS — E78.00 PURE HYPERCHOLESTEROLEMIA, UNSPECIFIED: ICD-10-CM

## 2024-06-10 DIAGNOSIS — Z90.3 ACQUIRED ABSENCE OF STOMACH [PART OF]: ICD-10-CM

## 2024-06-10 DIAGNOSIS — E03.9 HYPOTHYROIDISM, UNSPECIFIED: ICD-10-CM

## 2024-06-10 PROCEDURE — 99214 OFFICE O/P EST MOD 30 MIN: CPT | Mod: 25

## 2024-06-10 PROCEDURE — 93000 ELECTROCARDIOGRAM COMPLETE: CPT

## 2024-06-10 PROCEDURE — G2211 COMPLEX E/M VISIT ADD ON: CPT | Mod: NC

## 2024-06-10 RX ORDER — BUDESONIDE AND FORMOTEROL FUMARATE DIHYDRATE 160; 4.5 UG/1; UG/1
160-4.5 AEROSOL RESPIRATORY (INHALATION) TWICE DAILY
Qty: 30.6 | Refills: 1 | Status: COMPLETED | COMMUNITY
Start: 2023-08-15 | End: 2024-06-10

## 2024-06-10 RX ORDER — FLUTICASONE FUROATE AND VILANTEROL TRIFENATATE 200; 25 UG/1; UG/1
200-25 POWDER RESPIRATORY (INHALATION)
Qty: 1 | Refills: 2 | Status: COMPLETED | COMMUNITY
Start: 2023-10-02 | End: 2024-06-10

## 2024-06-10 RX ORDER — ALBUTEROL SULFATE 90 UG/1
108 (90 BASE) INHALANT RESPIRATORY (INHALATION)
Qty: 3 | Refills: 1 | Status: COMPLETED | COMMUNITY
Start: 2023-08-15 | End: 2024-06-10

## 2024-06-10 RX ORDER — OLOPATADINE HYDROCHLORIDE 665 UG/1
0.6 SPRAY, METERED NASAL
Qty: 3 | Refills: 1 | Status: COMPLETED | COMMUNITY
Start: 2023-08-15 | End: 2024-06-10

## 2024-06-10 NOTE — REASON FOR VISIT
[CV Risk Factors and Non-Cardiac Disease] : CV risk factors and non-cardiac disease [Hyperlipidemia] : hyperlipidemia [FreeTextEntry1] : This is a 54-year-old female with past medical history significant for HLD, hypothyroidism, KENTON not on CPAP, obesity, s/p sleeve gastrectomy on 09/26/24 with Dr. Blevins presenting today for cardiac evaluation.  The patient is doing well today overall. She denies chest pain, shortness of breath at rest, palpitations, dizziness, syncope, headaches, fatigue or lower extremity edema.  She reports history of elevated liver enzymes currently followed by gastroenterologist Dr. Noble. She underwent cholecystectomy 2/16/2023. Denies alcohol use.   Family history  significant for mother with history of CVA at 65 y/o, CAD s/p stent and pacemaker. Father with history of HTN and esophageal cancer.

## 2024-06-10 NOTE — DISCUSSION/SUMMARY
[FreeTextEntry1] : This is a 54-year-old female with past medical history significant for bariatric surgery gastric sleeve September 27, 2023, sleep apnea, hyperlipidemia, status post COVID-19 infection 2021, history of elevated liver function tests (followed by hepatologist), status post cholecystectomy, hypothyroidism, who comes in for follow-up cardiac evaluation. She denies chest pain, shortness of breath, dizziness or syncope.  She has no history of rheumatic fever.  She does not drink excessive caffeine or alcohol. Cardiac risk factors include hyperlipidemia, obesity. Family history significant for mother with history of CVA at 65 y/o, CAD s/p stent and pacemaker. Father with history of HTN and esophageal cancer. Electrocardiogram done Yolanda 10, 2024 demonstrated normal sinus rhythm rate of 62 bpm and is otherwise remarkable for 1 premature ventricular contraction. The patient will have new blood work done today for electrolytes and lipid panel. She is encouraged to continue her current diet and exercise program.  She will have new blood work done today for electrolytes, SMA 20, TSH, and hemoglobin A1c. The patient had normal exercise stress test August 10, 2023. Echo Doppler examination done August 9, 2023 demonstrated normal left ventricular ejection fraction greater than 55%. Lipid panel done June 21, 2023 demonstrated cholesterol 180, HDL 47, triglycerides 360, LDL calculated 61, non-HDL cholesterol 133 mg/dL (of note the patient was not fasting for this examination.  Hemoglobin A1c was 5.2 LDL direct was 80 mg/dL. She has tried multiple diets and exercise program without significant weight reduction.  She reports history of elevated liver enzymes currently followed by gastroenterologist Dr. Noble.  The patient has obstructive sleep apnea, not currently using her CPAP machine.  Electrocardiogram done June 21, 2021 demonstrates normal sinus rhythm at a rate of 68 bpm is otherwise remarkable for juvenile T wave pattern. She is currently hemodynamically stable and asymptomatic from a cardiac standpoint.  The patient understands that aerobic exercises must be increased to 40 minutes 4 times per week. A detailed discussion of lifestyle modification was done today. The patient has a good understanding of the diagnosis, and treatment plan. Lifestyle modification was also outlined..

## 2024-06-25 ENCOUNTER — LABORATORY RESULT (OUTPATIENT)
Age: 54
End: 2024-06-25

## 2024-06-25 ENCOUNTER — NON-APPOINTMENT (OUTPATIENT)
Age: 54
End: 2024-06-25

## 2024-06-25 DIAGNOSIS — R74.8 ABNORMAL LEVELS OF OTHER SERUM ENZYMES: ICD-10-CM

## 2024-10-15 ENCOUNTER — APPOINTMENT (OUTPATIENT)
Dept: PULMONOLOGY | Facility: CLINIC | Age: 54
End: 2024-10-15
Payer: COMMERCIAL

## 2024-10-15 VITALS
SYSTOLIC BLOOD PRESSURE: 115 MMHG | OXYGEN SATURATION: 98 % | WEIGHT: 121 LBS | BODY MASS INDEX: 23.75 KG/M2 | HEART RATE: 71 BPM | HEIGHT: 60 IN | DIASTOLIC BLOOD PRESSURE: 70 MMHG | RESPIRATION RATE: 16 BRPM | TEMPERATURE: 97.3 F

## 2024-10-15 DIAGNOSIS — R06.02 SHORTNESS OF BREATH: ICD-10-CM

## 2024-10-15 DIAGNOSIS — J45.909 UNSPECIFIED ASTHMA, UNCOMPLICATED: ICD-10-CM

## 2024-10-15 DIAGNOSIS — K21.9 GASTRO-ESOPHAGEAL REFLUX DISEASE W/OUT ESOPHAGITIS: ICD-10-CM

## 2024-10-15 DIAGNOSIS — G47.33 OBSTRUCTIVE SLEEP APNEA (ADULT) (PEDIATRIC): ICD-10-CM

## 2024-10-15 DIAGNOSIS — J30.89 OTHER ALLERGIC RHINITIS: ICD-10-CM

## 2024-10-15 PROCEDURE — 99214 OFFICE O/P EST MOD 30 MIN: CPT | Mod: 25

## 2024-10-15 PROCEDURE — 94010 BREATHING CAPACITY TEST: CPT

## 2024-10-15 RX ORDER — BUDESONIDE AND FORMOTEROL FUMARATE DIHYDRATE 160; 4.5 UG/1; UG/1
160-4.5 AEROSOL RESPIRATORY (INHALATION) TWICE DAILY
Qty: 1 | Refills: 2 | Status: ACTIVE | COMMUNITY
Start: 2024-10-15 | End: 1900-01-01

## 2024-10-15 NOTE — REASON FOR VISIT
[Follow-Up] : a follow-up visit [TextBox_44] : SOB, Mild Asthma, Allergies, GERD, HELENA, s/p bariatric surgery 9/2023

## 2024-10-15 NOTE — HISTORY OF PRESENT ILLNESS
[TextBox_4] : Ms. LONDON  is a 54 year old female presenting to the office today for a follow-up pulmonary evaluation. Her chief complaint is   -she notes feeling generally well -she notes losing weight (70 lbs)  -she notes energy levels are improved -she notes good quality of sleep -she notes sleeping for 6-7 hours  -she notes dry cough since returning from [place]  -she notes exercising (walking)  -she notes sinuses are quiet -she notes bowels are regular -she notes diet is good -she notes appetite is stable  -she denies any headaches, nausea, emesis, fever, chills, sweats, chest pain, chest pressure, wheezing, palpitations, diarrhea, constipation, dysphagia, vertigo, arthralgias, myalgias, leg swelling, itchy eyes, itchy ears, heartburn, reflux, or sour taste in the mouth.

## 2024-10-15 NOTE — ASSESSMENT
[FreeTextEntry1] : Ms. LONDON is a 54 year old female with a history of hypothyroid, thyroid goiter, COVID-19 x2 (6/2020, 2021), fatty liver, high cholesterol, asthma, GERD, HELENA, allergies, ow- SOB, Mild Asthma, Allergies, GERD, HELENA, s/p bariatric surgery 9/2023- improved with 62 lbs weight loss- thriving; mild asthma Sx  Her shortness of breath is multifactorial due to: -poor mechanics of breathing -out of shape -pulmonary disease  - Mild Intermittent Asthma (Seasonal) -cardiac disease ([place])  problem 1: Mild Intermittent Asthma (Seasonal)- active -Ventolin 2 puffs Q6H, if sick -Symbicort 160 2 inhalations BID PRN -Inhaler technique reviewed as well as oral hygiene techniques reviewed with patient. Avoidance of cold air, extremes of temperature, rescue inhaler should be used before exercise. Order of medication reviewed with patient. Recommended use of a cool mist humidifier in the bedroom. - Asthma is believed to be caused by inherited (genetic) and environmental factor, but its exact cause is unknown. - Asthma may be triggered by allergens, lung infections, or irritants in the air. Asthma triggers are different for each person  problem 2: Allergies / Sinus- active -get Blood work to include: asthma panel, food IgE panel, IgE level, eosinophil level, vitamin D level, alpha 1 antitrypsin levels (NC) -Olopatadine 0.6% 1 sniff BID Environmental measures for allergies were encouraged including mattress and pillow covers, air purifier, and environmental controls.  Problem 3: GERD- controlled -continue Pantoprazole 40 mg QAM -Rule of 2s: avoid eating too much, eating too late, eating too spicy, eating two hours before bed. -Things to avoid including overeating, spicy foods, tight clothing, eating within three hours of bed, this list is not all inclusive. -For treatment of reflux, possible options discussed including diet control, H2 blockers, PPIs, as well as coating motility agents discussed as treatment options. Timing of meals and proximity of last meal to sleep were discussed. If symptoms persist, a formal gastrointestinal evaluation is needed.  Problem 4: Moderate HELENA (AHI = 18.3) - (Risk Factors: elevated Mallampati class and thyroid goiter) -complete repeat home sleep study once she reaches her goal weight -complete home sleep study -CPAP 13 cm Dreamwear Nasal Mask Small in place Sleep apnea is associated with adverse clinical consequences which can affect most organ systems. Cardiovascular disease risk includes arrhythmias, atrial fibrillation, hypertension, coronary artery disease, and stroke. Metabolic disorders include diabetes type 2, non-alcoholic fatty liver disease. Mood disorder especially depression; and cognitive decline especially in the elderly. Associations with chronic reflux/Barretts esophagus some but not all inclusive. -Reasons include arousal consistent with hypopnea; respiratory events most prominent in REM sleep or supine position; therefore sleep staging and body position are important for accurate diagnosis and estimation of AHI.  problem 5: cardiac disease -recommended to continue to follow up with Cardiologist ([place])  problem 6: poor breathing mechanics -Proper breathing techniques were reviewed with an emphasis of exhalation. Patient instructed to breath in for 1 second and out for four seconds. Patient was encouraged to not talk while walking.  problem 7: overweight/ out of shape s/p bariatric surgery 9/2023- resolved (72 lbs down) -Weight loss, exercise, and diet control were discussed and are highly encouraged. Treatment options are given such as, aqua therapy, and contacting a nutritionist. Recommended to use the elliptical, stationary bike, less use of treadmill.  problem 8: health maintenance -recommended yearly flu shot after October 15 -recommended strep pneumonia vaccines: Prevnar-20 vaccine, followed by Pneumo vaccine 23 one year following after 65 years old. -recommended early intervention for Upper Respiratory Infections (URIs) -recommended regular osteoporosis evaluations -recommended early dermatological evaluations -recommended after the age of 50 to the age of 70, colonoscopy every 5 years  F/P in 4-6 months  She is encouraged to call with any changes, concerns, or questions

## 2024-10-15 NOTE — ADDENDUM
[FreeTextEntry1] : Documented by Jean Oconnell acting as a scribe for Dr. Edgar Shelton on 10/15/2024. All medical record entries made by the Scribe were at my, Dr. Edgar Shelton's, direction and personally dictated by me on 10/15/2024. I have reviewed the chart and agree that the record accurately reflects my personal performance of the history, physical exam, assessment and plan. I have also personally directed, reviewed, and agree with the discharge instructions.

## 2024-10-15 NOTE — PROCEDURE
[FreeTextEntry1] : PFTs revealed normal flows; FEV1 was  2.08L, which is 93% of predicted; normal flow volume loop.  PFTs were performed to evaluate for SOB  FENO was not covered; a normal value being less than 25 Fractional exhaled nitric oxide (FENO) is regarded as a simple, noninvasive method for assessing eosinophilic airway inflammation. Produced by a variety of cells within the lung, nitric oxide (NO) concentrations are generally low in healthy individuals. However, high concentrations of NO appear to be involved in nonspecific host defense mechanisms and chronic inflammatory diseases such as asthma. The American Thoracic Society (ATS) therefore has recommended using FENO to aid in the diagnosis and monitoring of eosinophilic airway inflammation and asthma, and for identifying steroid responsive individuals whose chronic respiratory symptoms may be caused by airway inflammation.   The American Thoracic Society (ATS) strongly recommends the use of FeNO measurement to aid in the assessment, management, and long-term monitoring of asthma. In their 2011 clinical practice guideline, the ATS emphasizes the importance of using FeNO.

## 2024-11-08 ENCOUNTER — RX RENEWAL (OUTPATIENT)
Age: 54
End: 2024-11-08

## 2025-01-02 ENCOUNTER — APPOINTMENT (OUTPATIENT)
Dept: CARDIOLOGY | Facility: CLINIC | Age: 55
End: 2025-01-02

## 2025-01-20 ENCOUNTER — RX RENEWAL (OUTPATIENT)
Age: 55
End: 2025-01-20

## 2025-01-21 ENCOUNTER — APPOINTMENT (OUTPATIENT)
Dept: ENDOCRINOLOGY | Facility: CLINIC | Age: 55
End: 2025-01-21

## 2025-01-30 ENCOUNTER — OUTPATIENT (OUTPATIENT)
Dept: OUTPATIENT SERVICES | Facility: HOSPITAL | Age: 55
LOS: 1 days | End: 2025-01-30
Payer: COMMERCIAL

## 2025-01-30 VITALS
DIASTOLIC BLOOD PRESSURE: 48 MMHG | SYSTOLIC BLOOD PRESSURE: 109 MMHG | WEIGHT: 122.14 LBS | OXYGEN SATURATION: 98 % | TEMPERATURE: 98 F | RESPIRATION RATE: 14 BRPM | HEART RATE: 65 BPM | HEIGHT: 61 IN

## 2025-01-30 DIAGNOSIS — D21.0 BENIGN NEOPLASM OF CONNECTIVE AND OTHER SOFT TISSUE OF HEAD, FACE AND NECK: ICD-10-CM

## 2025-01-30 DIAGNOSIS — Z01.818 ENCOUNTER FOR OTHER PREPROCEDURAL EXAMINATION: ICD-10-CM

## 2025-01-30 DIAGNOSIS — R22.1 LOCALIZED SWELLING, MASS AND LUMP, NECK: ICD-10-CM

## 2025-01-30 DIAGNOSIS — Z98.890 OTHER SPECIFIED POSTPROCEDURAL STATES: Chronic | ICD-10-CM

## 2025-01-30 DIAGNOSIS — Z90.3 ACQUIRED ABSENCE OF STOMACH [PART OF]: Chronic | ICD-10-CM

## 2025-01-30 DIAGNOSIS — Z98.51 TUBAL LIGATION STATUS: Chronic | ICD-10-CM

## 2025-01-30 DIAGNOSIS — Z90.49 ACQUIRED ABSENCE OF OTHER SPECIFIED PARTS OF DIGESTIVE TRACT: Chronic | ICD-10-CM

## 2025-01-30 PROBLEM — U07.1 COVID-19: Chronic | Status: INACTIVE | Noted: 2023-01-27 | Resolved: 2025-01-30

## 2025-01-30 PROCEDURE — G0463: CPT

## 2025-01-30 NOTE — H&P PST ADULT - NSICDXPASTMEDICALHX_GEN_ALL_CORE_FT
PAST MEDICAL HISTORY:  Class 1 obesity with body mass index (BMI) of 34.0 to 34.9 in adult     COVID-19 vaccine series completed     Elevated liver enzymes     H/O hepatitis     Hashimoto's thyroiditis     Heart murmur     History of COVID-19     History of sleep apnea     Hypothyroid     Mass of arm, left     Multinodular thyroid     Neck mass     KENTON (obstructive sleep apnea)

## 2025-01-30 NOTE — H&P PST ADULT - NSANTHOSAYNRD_GEN_A_CORE
KENTON resolved after bariatric surgery/No. KENTON screening performed.  STOP BANG Legend: 0-2 = LOW Risk; 3-4 = INTERMEDIATE Risk; 5-8 = HIGH Risk

## 2025-01-30 NOTE — H&P PST ADULT - PROBLEM SELECTOR PLAN 1
excision posterior neck mass. medical clearance requested. obtain blood work and EKG from PCP. surgical wash instructions reviewed and verbalized understanding. instructed to take synthroid AM of surgery with sips of water and instructed to stop MVI and biotin today

## 2025-01-30 NOTE — H&P PST ADULT - COMMENTS
history of covid x3 last 2024, mild symptoms and no treatment  denies any current cold or flu like symptoms, including fever, cough, sinus congestion, body aches or chills

## 2025-01-30 NOTE — H&P PST ADULT - HISTORY OF PRESENT ILLNESS
MED - [facility] - refilled.  Last Refill 1-20-22  Last OV 10-11-21  FUV/RTC  1 yr  Last Lab 1-19-22. Due for lab. Pt notified.  Pt states his mom just passed away. Refill sent. Pt states he will get in to have labs completed.  New S/O labs placed.         56 yo female presents with 2 year history of posterior neck mass. States that it has grown in size and now tender to touch. In was drained in the office but quickly recurred.

## 2025-01-30 NOTE — H&P PST ADULT - NSICDXFAMILYHX_GEN_ALL_CORE_FT
FAMILY HISTORY:  Father  Still living? No  Family history of throat cancer, Age at diagnosis: Age Unknown    Mother  Still living? Yes, Estimated age: 81-90  Family history of cardiac pacemaker, Age at diagnosis: Age Unknown

## 2025-01-30 NOTE — H&P PST ADULT - NSICDXPASTSURGICALHX_GEN_ALL_CORE_FT
PAST SURGICAL HISTORY:  History of laparoscopic cholecystectomy     History of sleeve gastrectomy     S/P correction of deviated nasal septum     S/P excision of lipoma     S/P tubal ligation     Status post biopsy of thyroid gland

## 2025-02-03 ENCOUNTER — APPOINTMENT (OUTPATIENT)
Dept: ENDOCRINOLOGY | Facility: CLINIC | Age: 55
End: 2025-02-03
Payer: COMMERCIAL

## 2025-02-03 VITALS
HEIGHT: 60 IN | DIASTOLIC BLOOD PRESSURE: 68 MMHG | SYSTOLIC BLOOD PRESSURE: 102 MMHG | HEART RATE: 79 BPM | BODY MASS INDEX: 24.36 KG/M2 | WEIGHT: 124.06 LBS | OXYGEN SATURATION: 99 %

## 2025-02-03 DIAGNOSIS — E04.2 NONTOXIC MULTINODULAR GOITER: ICD-10-CM

## 2025-02-03 DIAGNOSIS — E03.9 HYPOTHYROIDISM, UNSPECIFIED: ICD-10-CM

## 2025-02-03 DIAGNOSIS — G47.33 OBSTRUCTIVE SLEEP APNEA (ADULT) (PEDIATRIC): ICD-10-CM

## 2025-02-03 PROCEDURE — 99214 OFFICE O/P EST MOD 30 MIN: CPT

## 2025-02-03 NOTE — DATA REVIEWED
[FreeTextEntry1] : Focused Thyroid US 2/3/2025: Diffusely enlarged heterogenous gland with multiple pseudo nodules and 1.17 cm stable left lower nodule.   Labs 6/2024: TSH 0.95 A1c 5% Vit D 46.3 CMP normal except AST, ALT, ALKP elevation   Chol 181 HDL 73 LDL 82  Focused Thyroid US 1/2023: Diffusely enlarged heterogenous gland with multiple pseudo nodules and 1.2 cm left lower nodule.   Labs 9/2023: CMP normal except ALKP 138 Vit D 36.9 TSH 3.75 A1c 5.3%  Labs 6/2023: LDL 80 TSH 2.43  Thyroid Ultrasound Report 6/22/2022:  Technique: multiple real time longitudinal and transverse images were obtained using a high resolution ultrasound with a linear transducer, [employer] J[employer]B e 2008 model, 10-12 MHz frequencies. All measurements will be reported as longitudinal x anisha-posterior x transverse.  Comparison: Report dated 8/2015.   Indication: multinodular goiter.   Findings:  The right thyroid lobe measures 4.6 x 2.42 x 2.25 cm. The left thyroid lobe measures 4.8 x 2.57 x 2.32 cm. The isthmus measures 0.92 cm.  The right thyroid lobe has a heterogeneous parenchyma. It has multiple ill defined areas of hypoechogenicity and pseudomacronodular architecture.  The left thyroid lobe has a heterogeneous parenchyma . It has multiple ill defined areas of hypoechogenicity and pseudomacronodular architecture.   There are no distinct nodules visualized.   Labs 1/2022: Chemically euthyroid  Labs 6/2020: TSH 0.08 Free T4 1.4 LFTs normal  DXA 11/2016: Spine -1.6 Femoral Neck -1.4  Labs 10/2016: TSH 0.32 Free T4 1.5 TPO Ab + CBC normal CMP normal except AlkP of 124 LDL 40 HDL 50 Chol 158 Trig 342 A1c 5.3%  Thyroid Ultrasound Report 10/19/16:  Technique: multiple real time longitudinal and transverse images were obtained using a high resolution ultrasound with a linear transducer, [employer] J[employer]B e 2008 model, 10-12 MHz frequencies. All measurements will be reported as longitudinal x anisha-posterior x transverse.  Comparison: Report dated 8/2015.   Indication: multinodular goiter.   Findings:  The right thyroid lobe measures 4.50 x 1.98 x 2.19 cm. The left thyroid lobe measures 4.82 x 2.43 x 2.24 cm. The isthmus measures 0.86 cm.  The right thyroid lobe has a heterogeneous parenchyma. It has multiple ill defined areas of hypoechogenicity and pseudomacronodular architecture.  The left thyroid lobe has a heterogeneous parenchyma . It has multiple ill defined areas of hypoechogenicity and pseudomacronodular architecture.   There are no distinct nodules visualized.    Labs 8/2015: TSH 1.10  Thyroid Ultrasound Report 4/24/15: Technique: Multiple real time longitudinal and transverse images were obtained using a high resolution ultrasound with a linear transducer, [employer] J[employer] e 2008 model, 10-12 MHz frequencies. All measurements will be reported as longitudinal x anisha-posterior x transverse.  Comparison: Report dated 8/2014.   Indication: multinodular goiter.   Findings:  The right thyroid lobe measures 4.81 x 2.02 x 1.93 cm. The left thyroid lobe measures 4.82 x 2.24 x 2.64 cm. The isthmus measures 0.80 cm.  The right thyroid lobe has a heterogeneous parenchyma. It has multiple ill defined areas of hypoechogenicity and pseudomacronodular architecture.  The left thyroid lobe has a heterogeneous parenchyma . It has multiple ill defined areas of hypoechogenicity and pseudomacronodular architecture.   There are no distinct nodules visualized.   Labs 4/21/15: TSH 1.35 Free T4 1.1 TPO Ab +  Prolactin 19.8 TSH 5.9 (2012)  Thyroid U/S performed today in office 8/6/14- Bilaterally enlarged heterogenous gland with nodularity. 1 discrete nodule in right lower lobe measuring 0.85x1.16x1.19 hypoechoic, no internal vascularity or calcifications, regular margins.  Isthmus measures 0.83cm, Right Lobe 2.24x2.16x3.93, Left Lobe 2.37x2.43x4.62. Appears consistent with Hashimoto's.

## 2025-02-03 NOTE — PHYSICAL EXAM
[Alert] : alert [Well Nourished] : well nourished [Healthy Appearance] : healthy appearance [No Acute Distress] : no acute distress [Well Developed] : well developed [No Proptosis] : no proptosis [No Respiratory Distress] : no respiratory distress [No Accessory Muscle Use] : no accessory muscle use [Normal Rate and Effort] : normal respiratory rate and effort [Clear to Auscultation] : lungs were clear to auscultation bilaterally [Normal S1, S2] : normal S1 and S2 [Normal Rate] : heart rate was normal [Regular Rhythm] : with a regular rhythm [No Edema] : no peripheral edema [Normal Bowel Sounds] : normal bowel sounds [Not Tender] : non-tender [Not Distended] : not distended [Soft] : abdomen soft [No Stigmata of Cushings Syndrome] : no stigmata of Cushings Syndrome [No Clubbing, Cyanosis] : no clubbing  or cyanosis of the fingernails [No Involuntary Movements] : no involuntary movements were seen [Normal Strength/Tone] : muscle strength and tone were normal [No Motor Deficits] : the motor exam was normal [No Tremors] : no tremors [Oriented x3] : oriented to person, place, and time [Normal Affect] : the affect was normal [Normal Mood] : the mood was normal [Kyphosis] : no kyphosis present [Acne] : no acne [de-identified] : enlarged heterogenous gland on palpation

## 2025-02-03 NOTE — HISTORY OF PRESENT ILLNESS
[FreeTextEntry1] : 53 y/o F hx of hypothyroidism since age 8, seen initially by me 2014, was supposed to be on levothyroxine 100 mcg, but not taking it. Never got the prescription and forgot to take. Returned for follow-up  back on levothyroxine 100 mcg daily with TSH 0.32 Free T4 1.5 (10/2016) from 1.10 (2015). Seen by cardiology with palpitations. Levothyroxine decreased to 88 mcg. Remained chemically hyperthyroid until 2020 with some symptoms of anxiety and palpitations and TSH of 0.08. Recommended decrease dose to 75 mcg. Has been chemically euthyroid since then on this dose. Has lost weight doing Jumana now s/p gastric sleeve with Dr. Rivera 2023. Feeling much better with less palpitations, improvement in fatigue, hot flashes and sleep apnea. Adherent to levothyroxine. Was chemically euthyroid with recent labs by PCP 2024.  +Mother and grandmother with hx of hypothyroidism.  LMP .  . No hx of thyroid d/o during pregnancy. Worked up by Dr. Dhillon unclear reason for premature menopause. Recommended Premarin, but did not take it.   Noticed enlargement of neck. Hx of thyroid U/S with multinodular goiter. With FNA of 1 nodule  which was benign. Last thyroid U/S 2023 with heterogenous thyroid with pseudo nodules and 1.2cm left lower nodule. No dysphasia, no dysphonia, no orthopnea. Reports improved snoring. Referred or sleep study with evidence of moderate sleep apnea recommended CPAP, but has not needed to use it. Now improved s/p weight loss. No hx of childhood radiation exposure. Last DXA 2016 with osteopenia.  +constipation now on iron supplementation s/p gastric sleeve.   Awaiting sebaceous cyst removal.

## 2025-02-03 NOTE — PROCEDURE
[On[employer] e 2008 model, 10-12 MHz frequencies] : multiple real time longitudinal and transverse images were obtained using a high resolution ultrasound with a linear transducer, [employer] e 2008 model, 10-12 MHz frequencies. All measurements will be reported as longitudinal x anisha-posterior x transverse. [Report dated ___] : Report dated [unfilled] [Multinodular Goiter] : multinodular goiter [] : pseudomacronodular architecture [There are no distinct nodules visualized.] : There are no distinct nodules visualized. [FreeTextEntry1] : 4.50 x 1.98 x 2.19 [FreeTextEntry5] : 4.82 x 2.43 x 2.24 [FreeTextEntry2] : 0.86

## 2025-02-03 NOTE — ASSESSMENT
[FreeTextEntry1] : 55 y/o F w/   1.Hypothyroidism- Likely Hashimoto's given family hx and ultrasound findings. decreased levothyroxine to 75 mcg daily in 2020. Clinically and chemically euthyroid with last TFTs 6/2024 by PCP. Advised pt. to take levothyroxine daily in am on empty stomach at least 30 min prior to breakfast.    2. Multinodular Goiter- consistent with Hashimoto's. Thyroid U/S performed 10/2016, 2022, and 1/2023 (see results section for full description). She currently has no symptoms of tracheal compression or obstruction. Appears to be more of a cosmetic concern. Thyroid US today stable. Will continue to monitor. Will have patient send prior ultrasound reports and pathology report from prior evaluation of thyroid nodules.    3. HELENA- now improved with weight loss s/p gastric sleeve 9/2023.     4. Elevated Liver Function Tests- hx of hepatitis. Possible NAFLD. Follow-up with hepatology.    Discussion with patient regarding thyroid hormone regimen, HELENA, and multinodular goiter with management plan, risks and benefits, treatment options and goals of care answering all patients questions and addressing all concerns Focused Thyroid US performed at today's visit with review and discussion with the patient at the time of the visit.  Post-visit completion charting and review  Total Time 34 min    Specifically causes, evaluation, treatment options, risks, complications, and benefits of available therapies were discussed. Questions were answered.    The submitted E/M billing level for this visit reflects the total time spent on the day of the visit including face-to-face time spent with the patient, non-face-to-face review of medical records and relevant information, documentation, and asynchronous communication with the patient after a visit via phone, email, or patient's EHR portal after the visit.  The medical records reviewed are either scanned into the chart or reviewed with the patient using a patient's electronic medical records portal for patients with records not available to [facility] via electronic transmission platforms from other institutions and labs.  Time spend counseling and performing coordination of care was also included in determining the appropriate EM billing level.    I have reviewed and verified information regarding the chief complaint and history recorded by the ancillary staff and/or the patient. I have independently reviewed and interpreted tests performed by other physicians and facilities as necessary.    I have discussed with the patient differential diagnosis, reason for auxiliary tests if ordered, risks, benefits, alternatives, and complications of each form of therapy were discussed.

## 2025-02-04 PROBLEM — R22.1 LOCALIZED SWELLING, MASS AND LUMP, NECK: Chronic | Status: ACTIVE | Noted: 2025-01-30

## 2025-02-04 PROBLEM — Z86.16 PERSONAL HISTORY OF COVID-19: Chronic | Status: ACTIVE | Noted: 2025-01-30

## 2025-02-04 PROBLEM — Z92.29 PERSONAL HISTORY OF OTHER DRUG THERAPY: Chronic | Status: ACTIVE | Noted: 2025-01-30

## 2025-02-04 PROBLEM — E06.3 AUTOIMMUNE THYROIDITIS: Chronic | Status: ACTIVE | Noted: 2025-01-30

## 2025-02-04 PROBLEM — Z86.69 PERSONAL HISTORY OF OTHER DISEASES OF THE NERVOUS SYSTEM AND SENSE ORGANS: Chronic | Status: ACTIVE | Noted: 2025-01-30

## 2025-02-04 PROBLEM — E04.2 NONTOXIC MULTINODULAR GOITER: Chronic | Status: ACTIVE | Noted: 2025-01-30

## 2025-02-04 PROBLEM — R01.1 CARDIAC MURMUR, UNSPECIFIED: Chronic | Status: ACTIVE | Noted: 2025-01-30

## 2025-02-04 NOTE — ASU DISCHARGE PLAN (ADULT/PEDIATRIC) - CARE PROVIDER_API CALL
Brooklyn Corrales  Colon/Rectal Surgery  [address], [address]  [place] [address]  Phone: [phone]  Fax: [phone]  Follow Up Time:    Brooklyn Corrales  Colon/Rectal Surgery  [address], [address]  [place] [address]  Phone: [phone]  Fax: [phone]  Follow Up Time: 2 weeks

## 2025-02-04 NOTE — ASU DISCHARGE PLAN (ADULT/PEDIATRIC) - FINANCIAL ASSISTANCE
Health system provides services at a reduced cost to those who are determined to be eligible through Health system’s financial assistance program. Information regarding Health system’s financial assistance program can be found by going to [email or web address] or by calling [phone].

## 2025-02-04 NOTE — ASU DISCHARGE PLAN (ADULT/PEDIATRIC) - ASU DC SPECIAL INSTRUCTIONSFT
Follow all verbal and written instructions. Take medications as prescribed. DO NOT drive, operate machinery, and/or make important decisions while on prescription pain medication. DO NOT hesitate to call Doctor's office with questions or concerns. Certain prescription pain medication can cause constipation; take a stool softener such as Colace 100mg 3 x a day, to avoid the constipating effects of prescription pain medication. You just had surgery. Please follow the instructions below to make your recovery as comfortable as possible.    **REST! Apply ice packs to incision sites 20 mins on, 20 mins off every 4 hours for the first 24 hours.    If taking narcotic pain medications, you may take COLACE (OTC stool softener) as directed to prevent constipation.**    1. Depending on the procedure, you may or may not have pain. Please fill any prescriptions you have been given and take as directed.    2. Remove outer dressing (if any) in 24 hours before showering, leave white steri strips underneath in place.    3. You could experience minimal to mild blood staining of your dressing. If bleeding is persistent, but light, apply direct and gentle pressure to the area and lie flat in bed for 10-15 min. If bleeding is persistent and heavy or is associated with clots call the office immediately.    4. If you are unable to urinate after adequate hydration in 8-10 hours, call the office.    5. No lifting >20 lbs for 6-8 weeks    **Please call the office for an appointment when you get home ([phone]). If you have any questions, problems or concerns, do not hesitate to call the office.** You just had surgery. Please follow the instructions below to make your recovery as comfortable as possible.    **REST! Apply ice packs to incision sites 20 mins on, 20 mins off every 4 hours for the first 24 hours.    If taking narcotic pain medications, you may take COLACE (OTC stool softener) as directed to prevent constipation.**    1. Depending on the procedure, you may or may not have pain. Please fill any prescriptions you have been given and take as directed.    2. Leave white steri strips in place.    3. You could experience minimal to mild blood staining of your dressing. If bleeding is persistent, but light, apply direct and gentle pressure to the area and lie flat in bed for 10-15 min. If bleeding is persistent and heavy or is associated with clots call the office immediately.    4. If you are unable to urinate after adequate hydration in 8-10 hours, call the office.    5. No lifting >20 lbs for 6-8 weeks    **Please call the office for an appointment when you get home ([phone]). If you have any questions, problems or concerns, do not hesitate to call the office.**

## 2025-02-06 ENCOUNTER — TRANSCRIPTION ENCOUNTER (OUTPATIENT)
Age: 55
End: 2025-02-06

## 2025-02-06 ENCOUNTER — OUTPATIENT (OUTPATIENT)
Dept: OUTPATIENT SERVICES | Facility: HOSPITAL | Age: 55
LOS: 1 days | End: 2025-02-06
Payer: COMMERCIAL

## 2025-02-06 VITALS
OXYGEN SATURATION: 97 % | DIASTOLIC BLOOD PRESSURE: 59 MMHG | TEMPERATURE: 97 F | HEART RATE: 70 BPM | RESPIRATION RATE: 11 BRPM | HEIGHT: 60 IN | WEIGHT: 120.81 LBS | SYSTOLIC BLOOD PRESSURE: 110 MMHG

## 2025-02-06 VITALS
TEMPERATURE: 97 F | RESPIRATION RATE: 15 BRPM | HEART RATE: 58 BPM | DIASTOLIC BLOOD PRESSURE: 61 MMHG | OXYGEN SATURATION: 99 % | SYSTOLIC BLOOD PRESSURE: 94 MMHG

## 2025-02-06 DIAGNOSIS — Z98.890 OTHER SPECIFIED POSTPROCEDURAL STATES: Chronic | ICD-10-CM

## 2025-02-06 DIAGNOSIS — Z90.49 ACQUIRED ABSENCE OF OTHER SPECIFIED PARTS OF DIGESTIVE TRACT: Chronic | ICD-10-CM

## 2025-02-06 DIAGNOSIS — D21.0 BENIGN NEOPLASM OF CONNECTIVE AND OTHER SOFT TISSUE OF HEAD, FACE AND NECK: ICD-10-CM

## 2025-02-06 DIAGNOSIS — Z98.51 TUBAL LIGATION STATUS: Chronic | ICD-10-CM

## 2025-02-06 DIAGNOSIS — Z90.3 ACQUIRED ABSENCE OF STOMACH [PART OF]: Chronic | ICD-10-CM

## 2025-02-06 LAB
HCT VFR BLD CALC: 37.6 % — SIGNIFICANT CHANGE UP (ref 34.5–45)
HGB BLD-MCNC: 13 G/DL — SIGNIFICANT CHANGE UP (ref 11.5–15.5)
MCHC RBC-ENTMCNC: 31.2 PG — SIGNIFICANT CHANGE UP (ref 27–34)
MCHC RBC-ENTMCNC: 34.6 G/DL — SIGNIFICANT CHANGE UP (ref 32–36)
MCV RBC AUTO: 90.2 FL — SIGNIFICANT CHANGE UP (ref 80–100)
NRBC # BLD: 0 /100 WBCS — SIGNIFICANT CHANGE UP (ref 0–0)
NRBC BLD-RTO: 0 /100 WBCS — SIGNIFICANT CHANGE UP (ref 0–0)
PLATELET # BLD AUTO: 113 K/UL — LOW (ref 150–400)
RBC # BLD: 4.17 M/UL — SIGNIFICANT CHANGE UP (ref 3.8–5.2)
RBC # FLD: 12.4 % — SIGNIFICANT CHANGE UP (ref 10.3–14.5)
WBC # BLD: 4.98 K/UL — SIGNIFICANT CHANGE UP (ref 3.8–10.5)
WBC # FLD AUTO: 4.98 K/UL — SIGNIFICANT CHANGE UP (ref 3.8–10.5)

## 2025-02-06 PROCEDURE — 85027 COMPLETE CBC AUTOMATED: CPT

## 2025-02-06 PROCEDURE — 11422 EXC H-F-NK-SP B9+MARG 1.1-2: CPT

## 2025-02-06 PROCEDURE — 88304 TISSUE EXAM BY PATHOLOGIST: CPT

## 2025-02-06 PROCEDURE — 88304 TISSUE EXAM BY PATHOLOGIST: CPT | Mod: 26

## 2025-02-06 PROCEDURE — 36415 COLL VENOUS BLD VENIPUNCTURE: CPT

## 2025-02-06 RX ORDER — CEFAZOLIN SODIUM IN 0.9 % NACL 2 G/10 ML
2000 SYRINGE (ML) INTRAVENOUS ONCE
Refills: 0 | Status: COMPLETED | OUTPATIENT
Start: 2025-02-06 | End: 2025-02-06

## 2025-02-06 RX ORDER — SODIUM CHLORIDE 9 G/ML
1000 INJECTION, SOLUTION INTRAVENOUS
Refills: 0 | Status: DISCONTINUED | OUTPATIENT
Start: 2025-02-06 | End: 2025-02-06

## 2025-02-06 RX ORDER — BIOTIN 10 MG
1 TABLET ORAL
Refills: 0 | DISCHARGE

## 2025-02-06 RX ORDER — IBUPROFEN 600 MG/1
1 TABLET, FILM COATED ORAL
Qty: 9 | Refills: 0
Start: 2025-02-06 | End: 2025-02-08

## 2025-02-06 RX ADMIN — SODIUM CHLORIDE 75 MILLILITER(S): 9 INJECTION, SOLUTION INTRAVENOUS at 10:55

## 2025-02-06 NOTE — ASU PATIENT PROFILE, ADULT - ALCOHOL USE HISTORY SINGLE SELECT
AMBULATORY PROGRESS NOTE      Patient: Fuentes Bernard             MRN: [id number]     SSN: [id number] Body mass index is 30.57 kg/m². YOB: 1954     AGE: 76 y.o. EX: female    PCP: Karen Perry MD       IMPRESSION //  DIAGNOSIS AND TREATMENT PLAN        Fuentes Bernard has a diagnosis of:        No progressive significant radiographic signs of healing: no significant callous, interdigitation, or bone healing. DIAGNOSES    1. Closed nondisplaced fracture of second metatarsal bone of right foot, initial encounter    2. Primary osteoarthritis of right foot    3. Right foot pain    4. Closed nondisplaced fracture of fifth metatarsal bone of right foot with nonunion, subsequent encounter        Orders Placed This Encounter    Generic Supply Order     Ultrasound based bone stimulator    [[id number]] Foot Min 3V     Order Specific Question:   Weight bearing? Answer:   No            PLAN:    1. Obtain 3-View XR of right foot  2. DME: ultrasound base bone stimulator    RTO : 6 weeks    There are no Patient Instructions on file for this visit. Please follow up with your PCP for any health maintenance as recommended         Fuentes Bernard  expresses understanding of the diagnosis, treatment plan, and all of their proposed questions were answered to their satisfaction. Patient education has been provided re the diagnoses. [facility] //  [address] IS A 76 y.o. female who is a/an  established patient, presenting to my outpatient office for evaluation of  the following chief complaint(s):     Chief Complaint   Patient presents with    Foot Pain     F/U RT Foot       93 days out, from her initial visit of December 13, 2021: In which she presented with a stress fracture to the second metatarsal right foot, right: With history of a fourth metatarsal base fracture, history of osteopenia.           Her initial presentation      [facility] presents today w/ atraumatic right foot pain. She recalls having right foot pain about 3 miles into a walk on ~12/18/2021. She mentions now she has difficulty using steps and doing daily activities. H/O Vitamin D , two 5th metatarsal fractures of the right foot, borderline osteopenic.     At [address] presented w/ a right second metatarsal fracture. I advise the patient to continue wearing her hard sole shoe as she as an impeding stress fracture. Obtain 3-View XR of right foot. Obtain XRs of right foot at next OV. Since [address] continues to endorse right forefoot pain. She put a metatarsal pad in her shoe , but didn't have any pain relief. She takes calcium and vitamin D supplements as she is borderline osteopenic. She said her finger fracture has delayed healing as well. She has a vacation trip in May. Visit Vitals  Pulse 82   Temp 97.1 °F (36.2 °C) (Temporal)   Ht 5' 3\" (1.6 m)   Wt 172 lb 9.6 oz (78.3 kg)   SpO2 97%   BMI 30.57 kg/m²       Appearance: Alert, well appearing and pleasant patient who is in no distress, oriented to person, place/time, and who follows commands. Normal dress/motor activity/thought processes/memory. This patient is accompanied in the examination room by her  self. Patient arrives to office via: with assistive device: hard sole shoe    Psychiatric:  Normal Affect/mood. Judgement, behavior, and conduct are appropriate. Speech normal in context and clarity, memory intact grossly, no involuntary movements - tremors. H EENT (2): Head normocephalic & atraumatic. Eye: pupils are round// EOM are intact // Neck: ROM WNL  // Hearings Intact   Respiratory: Breathing non labored     ANKLE/FOOT right    Gait: uses assistive device hard sole shoe  Tenderness: mild over  forefoot at 2nd TMT region at Fx site  Cutaneous:  WNL.   Joint Motion:  WNL   Joint / Tendon Stability:  No Ankle or Subtalar instability or joint laxity. No peroneal sublux ability or dislocation  Alignment: neutral Hindfoot,    Neuro Motor/Sensory: NL/NL  Vascular: NL foot/ankle pulses,   Lymphatics: No extremity lymphedema, No calf swelling, no tenderness to calf muscles. CHART REVIEW     Kathy5 Levar Montero has been experiencing pain and discomfort confirmed as outlined in the pain assessment outlined below.  was reviewed by Loren Dubose MD on 3/16/2022. Pain Assessment  3/16/2022   Location of Pain Foot   Location Modifiers Right   Severity of Pain 0   Quality of Pain Sharp   Duration of Pain -   Frequency of Pain Intermittent   Aggravating Factors Walking; Other (Comment)   Aggravating Factors Comment movement   Limiting Behavior -   Relieving Factors Rest   Result of Injury No        Luis Cuitifjenny  has a past medical history of Anemia NEC, Arthritis, GERD (gastroesophageal reflux disease), Hypercholesterolemia, and Hypothyroidism due to acquired atrophy of thyroid (4/22/2015). She has no past medical history of Arrhythmia, Asthma, Autoimmune disease ([address] CAD (coronary artery disease), Calculus of kidney, Cancer ([address] Chronic kidney disease, Chronic pain, Congestive heart failure, unspecified, Contact dermatitis and other eczema, due to unspecified cause, COPD, Depression, Diabetes ([address] Headache(784.0), Hypertension, Liver disease, PUD (peptic ulcer disease), Seizures ([address] Stroke ([address] or Thromboembolus ([address] Patients is employed at:          has a past medical history of Anemia NEC, Arthritis, GERD (gastroesophageal reflux disease), Hypercholesterolemia, and Hypothyroidism due to acquired atrophy of thyroid (4/22/2015).     She has no past medical history of Arrhythmia, Asthma, Autoimmune disease ([address] CAD (coronary artery disease), Calculus of kidney, Cancer ([address] Chronic kidney disease, Chronic pain, Congestive heart failure, unspecified, Contact dermatitis and other eczema, due to unspecified cause, COPD, Depression, Diabetes ([address] Headache(784.0), Hypertension, Liver disease, PUD (peptic ulcer disease), Seizures ([address] Stroke ([address] or Thromboembolus ([facility] has a past surgical history that includes hx tonsillectomy; hx gyn; hx wrist fracture tx (09/01/2017); hand/finger surgery unlisted (2017); hand/finger surgery unlisted; hand/finger surgery unlisted; hand/finger surgery unlisted; and hand/finger surgery unlisted. family history includes Dementia in her mother; Diabetes in her mother; Heart Disease in her father; Hypertension in her mother; Lung Disease in her mother. Current Outpatient Medications   Medication Sig    rosuvastatin (CRESTOR) 20 mg tablet TAKE ONE TABLET BY MOUTH NIGHTLY    therapeutic multivitamin-minerals (THERAGRAN-M) tablet Take 1 Tablet by mouth daily.  coenzyme Q-10 (CO Q-10) 30 mg capsule Take 200 mg by mouth.  vitamin E (AQUA GEMS) 400 unit capsule Take 400 Units by mouth daily.  alendronate (FOSAMAX) 70 mg tablet Take 1 Tablet by mouth every seven (7) days.  estradioL (VAGIFEM) 10 mcg tab vaginal tablet INSERT 1 TABLET INTO VAGINA EVERY TUESDAY AND FRIDAY. DISCARD PREVIOUS ORDER    pantoprazole (PROTONIX) 40 mg tablet TAKE 1 TABLET BY MOUTH ONE TIME A DAY    hydrOXYchloroQUINE (Plaquenil) 200 mg tablet Take 400 mg by mouth daily.  cholecalciferol, vitamin D3, (VITAMIN D3) 2,000 unit Tab Take 5,000 Units by mouth.  loratadine (CLARITIN) 10 mg tablet Take 10 mg by mouth daily.  acetaminophen (TYLENOL) 325 mg tablet Take  by mouth every four (4) hours as needed. No current facility-administered medications for this visit.      Allergies   Allergen Reactions    Azulfidine [Sulfasalazine] Rash    Arava [Leflunomide] Other (comments)     Elevated liver enzymes    Iodinated Contrast Media Other (comments)     Severe overall skin reaction    Prilosec [Omeprazole Magnesium] Nausea Only     Social History     Occupational History    Not on file   Tobacco Use    Smoking status: Never Smoker    Smokeless tobacco: Never Used   Vaping Use    Vaping Use: Never used   Substance and Sexual Activity    Alcohol use: No    Drug use: No    Sexual activity: Not on file       reports that she has never smoked. She has never used smokeless tobacco. She reports that she does not drink alcohol and does not use drugs. DIAGNOSTIC LAB DATA      Lab Results   Component Value Date/Time    Hemoglobin A1c 5.9 (H) 10/27/2021 12:00 AM    Hemoglobin A1c 6.0 (H) 04/28/2021 12:00 AM    Hemoglobin A1c 6.0 (H) 10/29/2020 09:28 AM    //   Lab Results   Component Value Date/Time    Glucose 77 10/27/2021 12:00 AM        No results found for: [id number], [id number]      Lab Results   Component Value Date/Time    Vitamin D 25-Hydroxy 30.3 05/01/2020 08:20 AM    VITAMIN D, 25-HYDROXY 46.2 10/27/2021 12:00 AM        Drug Screen Most Recent Result Date    No resulted procedures found. REVIEW OF SYSTEMS : 3/16/2022  ALL BELOW ARE Negative except : SEE HPI     All other systems reviewed and are negative. 12 point review of systems otherwise negative unless noted in HPI. RADIOGRAPHS// IMAGING//DIAGNOSTIC DATA     Orders Placed This Encounter    Generic Supply Order    [[id number]] Foot Min 3V        Right foot 3 views x-rays, 3 views, AP/LAT/OBL completed 3/16/2022 AT [address] [place] [address]  3/16/2022     X-rays reveal there is a fracture left second tarsal base, but still present, I see no significant interval progressive signs of healing on these x-rays at this current time. Her previous x-rays. No progressive significant radiographic signs of healing: no significant callous, interdigitation, or bone healing.,  Nonunion seen. //there are no dislocation or subluxation noted. Overall alignment is   acceptable. Soft tissue swelling is minimal  noted. No osteolytic or osteoblastic lesions noted.  Mineralization suggests yes never osteopenia. Degenerative changes are yes to the 2/ 3 TMT joint articulation noted. Calcified vessels are not present. I have personally reviewed the images of the above study. The interpretation of this study is my professional opinion             I have spent 20 minutes reviewing the previous notes, reviewing diagnostic studies [Advanced  Imaging, Diagnostic test results (x-rays)] and had a direct face to face with the patient discussing the diagnosis and importance of compliance with the treatment and plan. There is  discussion for the potential for surgery, answering all questions, as well as documenting patient care coordination for this individual on the day of the visit. Disclaimer:     Sections of this note are dictated using utilizing voice recognition software, which may have resulted in some phonetic based errors in grammar and contents. Even though attempts were made to correct all the mistakes, some may have been missed, and remained in the body of the document. If questions arise, please contact our department. An electronic signature was used to authenticate this note. [facility] may have a reminder for a \"due or due soon\" health maintenance. I have asked that she contact her primary care provider for follow-up on this health maintenance. There are no Patient Instructions on file for this visit. Please follow up with your PCP for any health maintenance as recommended.               Megan Garcia, as dictated by, Rabia Buckley  3/16/2022  1:59 PM

## 2025-02-06 NOTE — ASU PATIENT PROFILE, ADULT - AS SC BRADEN MOISTURE
[facility] Note    Subjective   Patient ID: Kerri is a 11 year old female.    Chief Complaint   Patient presents with   • Ear Pain     left     HPI   This is 11 years old girl brought in by her mother with complaint of left ear pain started 2 days ago.  Initially pain was mild and has gotten worse today.  Denies decreased hearing.  No fever.  No cough.  No nasal or sinus symptoms.    Medications:  Current Outpatient Medications   Medication Sig   • ciprofloxacin-dexamethasone (Ciprodex) otic suspension Place 4 drops into left ear 2 times daily for 5 doses.     No current facility-administered medications for this visit.       Allergies:  ALLERGIES:   Allergen Reactions   • Cefdinir HIVES       Past Medical History  No past medical history on file.    Past Surgical History:  No past surgical history on file.    Family History:  No family history on file.    Social History:  Social History     Tobacco Use   • Smoking status: Not on file   • Smokeless tobacco: Not on file   Substance Use Topics   • Alcohol use: Not on file   • Drug use: Not on file       Review of Systems:  Patient's medications, allergies, past medical, surgical, and social history  were reviewed and updated as appropriate.    Review of Systems   Constitutional: Negative for appetite change and fever.   HENT: Positive for ear pain. Negative for sore throat.    Eyes: Negative for pain.   Respiratory: Negative for cough, chest tightness and shortness of breath.    Cardiovascular: Negative for chest pain.   Gastrointestinal: Negative for abdominal pain, nausea and vomiting.   Genitourinary: Negative for difficulty urinating.   Musculoskeletal: Negative for arthralgias.   Skin: Negative for rash.   Neurological: Negative for headaches.   Psychiatric/Behavioral: Negative for confusion.       Objective:    Visit Vitals  BP (!) 116/82   Pulse 98   Temp 98.8 °F (37.1 °C)   Resp (!) 16   Wt 40.2 kg (88 lb 9.6  oz)   SpO2 100%       Physical Exam  Vitals and nursing note reviewed.   Constitutional:       General: She is active.      Appearance: Normal appearance.   HENT:      Head: Normocephalic.      Right Ear: Tympanic membrane, ear canal and external ear normal.      Left Ear: There is pain on movement. Tenderness present.      Nose: Nose normal.      Mouth/Throat:      Mouth: Mucous membranes are moist.   Eyes:      Conjunctiva/sclera: Conjunctivae normal.   Cardiovascular:      Rate and Rhythm: Normal rate and regular rhythm.   Pulmonary:      Effort: Pulmonary effort is normal.   Abdominal:      Palpations: Abdomen is soft.   Skin:     General: Skin is warm and dry.      Capillary Refill: Capillary refill takes less than 2 seconds.   Neurological:      Mental Status: She is alert.   Psychiatric:         Mood and Affect: Mood normal.         Behavior: Behavior normal.         Labs:  No results found for this visit on 02/23/22.    Imaging:   No results found.       Procedures:  Procedures       Assessment and Plan:  Assessment   Acute swimmer's ear of left side       Medical Decision Making:  This is 11 years old girl brought in by her mother with complaint of left ear pain.  On exam there is pain on pulling on left auricle as well as with pressure on tragus.  On speculum exam mild swelling and erythema of outer one third of external ear canal.  Exam and symptoms consistent with otitis externa.  I will start her on Ciprodex otic 4 drops twice a day for 5 days.  Advised mother to give her Tylenol ibuprofen as needed for ear pain.  He was discharged home in stable condition no acute distress  Discussed that the [facility] is not an exhaustive resource and should not take the place of primary care. Discussed following up with PCP in 2-3 days or sooner if symptoms worsen    Instructions provided as documented in the AVS.    Thank you for visiting [facility].        Mer Patterson MD   2/23/2022     (4) rarely moist

## 2025-02-06 NOTE — ASU PATIENT PROFILE, ADULT - AVIAN FLU SYMPTOMS
Meds: Tylenol, Ibuprofen, Valium  Get plenty of rest and fluids.  Follow up with your primary care doctor.  Return to the ER with worsening symptoms or concerns.    
No

## 2025-02-06 NOTE — BRIEF OPERATIVE NOTE - NSICDXBRIEFPROCEDURE_GEN_ALL_CORE_FT
PROCEDURES:  Excision of benign skin lesion (excluding skin tags) of neck, 1.6 to 2.0cm 06-Feb-2025 10:36:58  Shawnee Dunn

## 2025-02-13 ENCOUNTER — APPOINTMENT (OUTPATIENT)
Dept: OBGYN | Facility: CLINIC | Age: 55
End: 2025-02-13

## 2025-03-28 ENCOUNTER — NON-APPOINTMENT (OUTPATIENT)
Age: 55
End: 2025-03-28

## 2025-04-09 DIAGNOSIS — Z90.3 ACQUIRED ABSENCE OF STOMACH [PART OF]: ICD-10-CM

## 2025-04-14 ENCOUNTER — APPOINTMENT (OUTPATIENT)
Dept: SURGERY | Facility: CLINIC | Age: 55
End: 2025-04-14

## 2025-04-14 VITALS
HEART RATE: 65 BPM | OXYGEN SATURATION: 96 % | HEIGHT: 60 IN | RESPIRATION RATE: 18 BRPM | DIASTOLIC BLOOD PRESSURE: 61 MMHG | TEMPERATURE: 96.4 F | SYSTOLIC BLOOD PRESSURE: 100 MMHG | WEIGHT: 125 LBS | BODY MASS INDEX: 24.54 KG/M2

## 2025-04-14 PROCEDURE — 99214 OFFICE O/P EST MOD 30 MIN: CPT

## 2025-04-15 ENCOUNTER — APPOINTMENT (OUTPATIENT)
Dept: PULMONOLOGY | Facility: CLINIC | Age: 55
End: 2025-04-15
Payer: COMMERCIAL

## 2025-04-15 VITALS
HEART RATE: 62 BPM | RESPIRATION RATE: 18 BRPM | DIASTOLIC BLOOD PRESSURE: 80 MMHG | BODY MASS INDEX: 24.15 KG/M2 | SYSTOLIC BLOOD PRESSURE: 110 MMHG | TEMPERATURE: 97.3 F | OXYGEN SATURATION: 96 % | HEIGHT: 60 IN | WEIGHT: 123 LBS

## 2025-04-15 DIAGNOSIS — R06.02 SHORTNESS OF BREATH: ICD-10-CM

## 2025-04-15 DIAGNOSIS — K21.9 GASTRO-ESOPHAGEAL REFLUX DISEASE W/OUT ESOPHAGITIS: ICD-10-CM

## 2025-04-15 DIAGNOSIS — J45.909 UNSPECIFIED ASTHMA, UNCOMPLICATED: ICD-10-CM

## 2025-04-15 DIAGNOSIS — J30.89 OTHER ALLERGIC RHINITIS: ICD-10-CM

## 2025-04-15 DIAGNOSIS — G47.33 OBSTRUCTIVE SLEEP APNEA (ADULT) (PEDIATRIC): ICD-10-CM

## 2025-04-15 PROCEDURE — 95012 NITRIC OXIDE EXP GAS DETER: CPT

## 2025-04-15 PROCEDURE — 94727 GAS DIL/WSHOT DETER LNG VOL: CPT

## 2025-04-15 PROCEDURE — 99214 OFFICE O/P EST MOD 30 MIN: CPT | Mod: 25

## 2025-04-15 PROCEDURE — 94010 BREATHING CAPACITY TEST: CPT

## 2025-04-15 PROCEDURE — 94729 DIFFUSING CAPACITY: CPT

## 2025-04-15 PROCEDURE — ZZZZZ: CPT

## 2025-04-15 NOTE — HISTORY OF PRESENT ILLNESS
[TextBox_4] : Ms. LONDON  is a 55 year old female presenting to the office today for a follow-up pulmonary evaluation. Her chief complaint is   -she notes feeling improved -she notes exercising (Molly) -she notes vision is stable -she notes good quality of sleep -she notes sleeping for 7 hours  -she denies RLS  -she notes energy levels are 9/10 -she notes appetite is stable -she notes diet is good -she notes adequate protein intake  -she notes sinuses are quiet -she notes having an illness recently but used her inhalers to treat it  -she notes losing weight  -she denies any headaches, nausea, emesis, fever, chills, sweats, chest pain, chest pressure, coughing, wheezing, palpitations, diarrhea, constipation, dysphagia, vertigo, arthralgias, myalgias, leg swelling, itchy eyes, itchy ears, heartburn, reflux, or sour taste in the mouth.

## 2025-04-15 NOTE — PROCEDURE
[FreeTextEntry1] : Full PFT reveals normal flows; FEV1 was  2.11L which is 89% of predicted; normal lung volumes; normal diffusion at 17.20 which is 95% of predicted; normal flow volume loop. PFTs were performed to evaluate for SOB  FENO was 6; a normal value being less than 25 Fractional exhaled nitric oxide (FENO) is regarded as a simple, noninvasive method for assessing eosinophilic airway inflammation. Produced by a variety of cells within the lung, nitric oxide (NO) concentrations are generally low in healthy individuals. However, high concentrations of NO appear to be involved in nonspecific host defense mechanisms and chronic inflammatory diseases such as asthma. The American Thoracic Society (ATS) therefore has recommended using FENO to aid in the diagnosis and monitoring of eosinophilic airway inflammation and asthma, and for identifying steroid responsive individuals whose chronic respiratory symptoms may be caused by airway inflammation.   The American Thoracic Society (ATS) strongly recommends the use of FeNO measurement to aid in the assessment, management, and long-term monitoring of asthma. In their 2011 clinical practice guideline, the ATS emphasizes the importance of using FeNO.

## 2025-04-15 NOTE — ADDENDUM
[FreeTextEntry1] : Documented by Jean Oconnell acting as a scribe for Dr. Edgar Shelton on 04/15/2025. All medical record entries made by the Scribe were at my, Dr. Edgar Shelton's, direction and personally dictated by me on 04/15/2025. I have reviewed the chart and agree that the record accurately reflects my personal performance of the history, physical exam, assessment and plan. I have also personally directed, reviewed, and agree with the discharge instructions.

## 2025-04-15 NOTE — ASSESSMENT
[FreeTextEntry1] : Ms. LONDON is a 54 year old female with a history of hypothyroid, thyroid goiter, COVID-19 x2 (6/2020, 2021), fatty liver, high cholesterol, asthma, GERD, HEELNA, allergies, ow- SOB, Mild Asthma, Allergies, GERD, HELENA, s/p bariatric surgery 9/2023- improved with 62 lbs weight loss- thriving; mild asthma Sx - stable/thriving   Her shortness of breath is multifactorial due to: -poor mechanics of breathing -out of shape -pulmonary disease  - Mild Intermittent Asthma (Seasonal) -cardiac disease ([place])  problem 1: Mild Intermittent Asthma (Seasonal)- active -Ventolin 2 puffs Q6H, if sick -Symbicort 160 2 inhalations BID PRN -Inhaler technique reviewed as well as oral hygiene techniques reviewed with patient. Avoidance of cold air, extremes of temperature, rescue inhaler should be used before exercise. Order of medication reviewed with patient. Recommended use of a cool mist humidifier in the bedroom. - Asthma is believed to be caused by inherited (genetic) and environmental factor, but its exact cause is unknown. - Asthma may be triggered by allergens, lung infections, or irritants in the air. Asthma triggers are different for each person  problem 2: Allergies / Sinus- active -get Blood work to include: asthma panel, food IgE panel, IgE level, eosinophil level, vitamin D level, alpha 1 antitrypsin levels (NC) -Olopatadine 0.6% 1 sniff BID Environmental measures for allergies were encouraged including mattress and pillow covers, air purifier, and environmental controls.  Problem 3: GERD- controlled -continue Pantoprazole 40 mg QAM -Rule of 2s: avoid eating too much, eating too late, eating too spicy, eating two hours before bed. -Things to avoid including overeating, spicy foods, tight clothing, eating within three hours of bed, this list is not all inclusive. -For treatment of reflux, possible options discussed including diet control, H2 blockers, PPIs, as well as coating motility agents discussed as treatment options. Timing of meals and proximity of last meal to sleep were discussed. If symptoms persist, a formal gastrointestinal evaluation is needed.  Problem 4: Moderate HELENA (AHI = 18.3) - (Risk Factors: elevated Mallampati class and thyroid goiter) -complete repeat home sleep study once she reaches her goal weight -complete home sleep study -CPAP 13 cm Dreamwear Nasal Mask Small in place Sleep apnea is associated with adverse clinical consequences which can affect most organ systems. Cardiovascular disease risk includes arrhythmias, atrial fibrillation, hypertension, coronary artery disease, and stroke. Metabolic disorders include diabetes type 2, non-alcoholic fatty liver disease. Mood disorder especially depression; and cognitive decline especially in the elderly. Associations with chronic reflux/Barretts esophagus some but not all inclusive. -Reasons include arousal consistent with hypopnea; respiratory events most prominent in REM sleep or supine position; therefore sleep staging and body position are important for accurate diagnosis and estimation of AHI.  problem 5: cardiac disease -recommended to continue to follow up with Cardiologist ([place])  problem 6: poor breathing mechanics -Proper breathing techniques were reviewed with an emphasis of exhalation. Patient instructed to breath in for 1 second and out for four seconds. Patient was encouraged to not talk while walking.  problem 7: overweight/ out of shape s/p bariatric surgery 9/2023- resolved (72 lbs down) -Weight loss, exercise, and diet control were discussed and are highly encouraged. Treatment options are given such as, aqua therapy, and contacting a nutritionist. Recommended to use the elliptical, stationary bike, less use of treadmill.  problem 8: health maintenance -recommended yearly flu shot after October 15 2024 -recommended strep pneumonia vaccines: Prevnar-20 vaccine, followed by Pneumo vaccine 23 one year following after 65 years old. -recommended early intervention for Upper Respiratory Infections (URIs) -recommended regular osteoporosis evaluations -recommended early dermatological evaluations -recommended after the age of 50 to the age of 70, colonoscopy every 5 years  F/P in 4-6 months  She is encouraged to call with any changes, concerns, or questions

## 2025-04-28 PROBLEM — E66.811 CLASS 1 OBESITY WITH BODY MASS INDEX (BMI) OF 33.0 TO 33.9 IN ADULT: Status: ACTIVE | Noted: 2023-06-21

## 2025-05-02 ENCOUNTER — APPOINTMENT (OUTPATIENT)
Dept: OBGYN | Facility: CLINIC | Age: 55
End: 2025-05-02

## 2025-07-11 ENCOUNTER — APPOINTMENT (OUTPATIENT)
Dept: CARDIOLOGY | Facility: CLINIC | Age: 55
End: 2025-07-11
Payer: COMMERCIAL

## 2025-07-11 VITALS
HEIGHT: 59 IN | RESPIRATION RATE: 16 BRPM | SYSTOLIC BLOOD PRESSURE: 110 MMHG | OXYGEN SATURATION: 98 % | TEMPERATURE: 97.8 F | BODY MASS INDEX: 25.2 KG/M2 | WEIGHT: 125 LBS | HEART RATE: 59 BPM | DIASTOLIC BLOOD PRESSURE: 68 MMHG

## 2025-07-11 PROCEDURE — 93000 ELECTROCARDIOGRAM COMPLETE: CPT

## 2025-07-11 PROCEDURE — G2211 COMPLEX E/M VISIT ADD ON: CPT | Mod: NC

## 2025-07-11 PROCEDURE — 99214 OFFICE O/P EST MOD 30 MIN: CPT

## 2025-07-11 NOTE — ASSESSMENT
[FreeTextEntry1] : This is a 55-year-old female with past medical history significant for bariatric surgery gastric sleeve September 27, 2023, sleep apnea, hyperlipidemia, status post COVID-19 infection 2021, history of elevated liver function tests (followed by hepatologist), status post cholecystectomy, hypothyroidism, who comes in for follow-up cardiac evaluation.  She has no history of rheumatic fever.  She does not drink excessive caffeine or alcohol.  Cardiac risk factors include hyperlipidemia, obesity.  Family history significant for mother with history of CVA at 65 y/o, CAD s/p stent and pacemaker. Father with history of HTN and esophageal cancer.   HPI: She is feeling generally well today and denies chest pain, dizziness, heart palpitations, recent episodes of syncope or falls, SOB, or dyspnea at this time.  Current Medications: None cardiac    BLOOD PRESSURE: Stable    BLOOD WORK: - Blood work done March 2025 demonstrated cholesterol 175, HDL 71, triglycerides 218, LDL 72, non- - Blood work done June 2024 demonstrated triglycerides 153, cholesterol 181, HDL 73, LDL 82, non-, LDL direct 88.   TESTING/REPORTS: -EKG done July 11, 2025 demonstrated sinus bradycardia rate 59 bpm otherwise unremarkable for left atrial abnormality.  -Electrocardiogram done Sirisha 10, 2024 demonstrated normal sinus rhythm rate of 62 bpm and is otherwise remarkable for 1 premature ventricular contraction.  -The patient had normal exercise stress test August 10, 2023.  -Echo Doppler examination done August 9, 2023 demonstrated normal left ventricular ejection fraction greater than 55%.   PLAN: -Patient will schedule echocardiogram doppler examination to evaluate murmur, left ventricular function, chamber size, and rule out hypertrophy. -She will schedule an exercise stress test to evaluate for significant coronary artery disease.   I have discussed the plan of care with SHIMA LITA and she will follow up in 6-8 months. They are compliant with all of their medications.     The patient understands that aerobic exercises must be increased to 40 minutes 4 times/week and a detailed discussion of lifestyle modification was done today.   The patient has a good understanding of the diagnosis, treatment plan and lifestyle modification.   They will contact me at the office for any questions with their care or any changes in their health status.   The patient was discussed with supervision physician Dr. Ramez Gillis at the time of the visit and the plan of care will be carried out as noted above.     CAROLE Chan NP

## 2025-07-11 NOTE — REASON FOR VISIT
[CV Risk Factors and Non-Cardiac Disease] : CV risk factors and non-cardiac disease [Hyperlipidemia] : hyperlipidemia [FreeTextEntry3] : Dr. Quezada [FreeTextEntry1] : This is a 54-year-old female with past medical history significant for HLD, hypothyroidism, HELENA not on CPAP, obesity, s/p sleeve gastrectomy on 09/26/24 with Dr. Rivera presenting today for cardiac evaluation.  The patient is doing well today overall. She denies chest pain, shortness of breath at rest, palpitations, dizziness, syncope, headaches, fatigue or lower extremity edema.  She reports history of elevated liver enzymes currently followed by gastroenterologist Dr. Lyle. She underwent cholecystectomy 2/16/2023. Denies alcohol use.   Family history  significant for mother with history of CVA at 65 y/o, CAD s/p stent and pacemaker. Father with history of HTN and esophageal cancer.

## 2025-07-11 NOTE — DISCUSSION/SUMMARY
[FreeTextEntry1] : Dr. Gillis-(PRIOR VISIT and PMH WITH Dr. Gillis): This is a 54-year-old female with past medical history significant for bariatric surgery gastric sleeve September 27, 2023, sleep apnea, hyperlipidemia, status post COVID-19 infection 2021, history of elevated liver function tests (followed by hepatologist), status post cholecystectomy, hypothyroidism, who comes in for follow-up cardiac evaluation. She denies chest pain, shortness of breath, dizziness or syncope.  She has no history of rheumatic fever.  She does not drink excessive caffeine or alcohol. Cardiac risk factors include hyperlipidemia, obesity. Family history significant for mother with history of CVA at 65 y/o, CAD s/p stent and pacemaker. Father with history of HTN and esophageal cancer. Electrocardiogram done Sirisha 10, 2024 demonstrated normal sinus rhythm rate of 62 bpm and is otherwise remarkable for 1 premature ventricular contraction. The patient will have new blood work done today for electrolytes and lipid panel. She is encouraged to continue her current diet and exercise program.  She will have new blood work done today for electrolytes, SMA 20, TSH, and hemoglobin A1c. The patient had normal exercise stress test August 10, 2023. Echo Doppler examination done August 9, 2023 demonstrated normal left ventricular ejection fraction greater than 55%. Lipid panel done June 21, 2023 demonstrated cholesterol 180, HDL 47, triglycerides 360, LDL calculated 61, non-HDL cholesterol 133 mg/dL (of note the patient was not fasting for this examination.  Hemoglobin A1c was 5.2 LDL direct was 80 mg/dL. She has tried multiple diets and exercise program without significant weight reduction.  She reports history of elevated liver enzymes currently followed by gastroenterologist Dr. Lyle.  The patient has obstructive sleep apnea, not currently using her CPAP machine.  Electrocardiogram done June 21, 2021 demonstrates normal sinus rhythm at a rate of 68 bpm is otherwise remarkable for juvenile T wave pattern. She is currently hemodynamically stable and asymptomatic from a cardiac standpoint.  The patient understands that aerobic exercises must be increased to 40 minutes 4 times per week. A detailed discussion of lifestyle modification was done today. The patient has a good understanding of the diagnosis, and treatment plan. Lifestyle modification was also outlined..

## (undated) DEVICE — DRAPE MAYO STAND 30"

## (undated) DEVICE — COVIDIEN SIGNIA POWER CONTROL SHELL

## (undated) DEVICE — APPLICATOR VISTASEAL LAP DUAL 45CM FLEX

## (undated) DEVICE — POSITIONER STRAP ARMBOARD VELCRO TS-30

## (undated) DEVICE — DRAPE TOWEL BLUE 17" X 24"

## (undated) DEVICE — VISTASEAL DUAL APPLICATOR

## (undated) DEVICE — VENODYNE/SCD SLEEVE CALF MEDIUM

## (undated) DEVICE — SUT MONOCRYL 4-0 18" P-3 UNDYED

## (undated) DEVICE — SOL IRR POUR NS 0.9% 500ML

## (undated) DEVICE — DRAPE 1/2 SHEET 40X57"

## (undated) DEVICE — PREP BETADINE KIT

## (undated) DEVICE — IRRIGATION TRAY W PISTON SYRINGE 60ML

## (undated) DEVICE — GOWN XL EXTRA LONG

## (undated) DEVICE — APPLICATOR VISTASEAL LAP DUAL 35CM RIGID

## (undated) DEVICE — SUT POLYSORB 0 30" GU-46

## (undated) DEVICE — TUBING INSUFFLATION LAP FILTER 10FT

## (undated) DEVICE — NDL HYPO SAFE 22G X 1.5" (BLACK)

## (undated) DEVICE — ELCTR BOVIE TIP BLADE INSULATED 2.75" EDGE

## (undated) DEVICE — WARMING BLANKET FULL ADULT

## (undated) DEVICE — D HELP - CLEARVIEW CLEARIFY SYSTEM

## (undated) DEVICE — DRAPE SPLIT SHEET 77" X 120"

## (undated) DEVICE — GOWN TRIMAX LG

## (undated) DEVICE — BASIN SET DOUBLE

## (undated) DEVICE — SYR LUER LOK 20CC

## (undated) DEVICE — DRSG MASTISOL

## (undated) DEVICE — TAPE SILK 3"

## (undated) DEVICE — SUT VICRYL 2-0 27" SH UNDYED

## (undated) DEVICE — PACK MINOR WITH LAP

## (undated) DEVICE — PREP CHLORAPREP HI-LITE ORANGE 26ML

## (undated) DEVICE — SMOKE EVACUTATION SYS LAPROSCOPIC AC/PA

## (undated) DEVICE — ENDOCATCH II 15MM

## (undated) DEVICE — DRSG STERISTRIPS 0.5 X 4"

## (undated) DEVICE — SOL IRR POUR NS 0.9% 1000ML

## (undated) DEVICE — GLV 8 PROTEXIS (WHITE)

## (undated) DEVICE — SUT MONOCRYL 4-0 27" PS-2 UNDYED

## (undated) DEVICE — DRAPE LAPAROTOMY TRANSVERSE

## (undated) DEVICE — TROCAR COVIDIEN VERSASTEP PLUS 12MM STANDARD

## (undated) DEVICE — SUT BIOSYN 4-0 18" P-12

## (undated) DEVICE — VENODYNE/SCD SLEEVE CALF LARGE

## (undated) DEVICE — TROCAR COVIDIEN VERSASTEP PLUS 15MM STANDARD

## (undated) DEVICE — PACK ADVANCED LAPAROSCOPIC NS

## (undated) DEVICE — MEDICATION LABELS W MARKER

## (undated) DEVICE — PACK GENERAL LAPAROSCOPY

## (undated) DEVICE — ELCTR GROUNDING PAD ADULT COVIDIEN

## (undated) DEVICE — DRAPE 3/4 SHEET 52X76"

## (undated) DEVICE — GLV 6 PROTEXIS (BLUE)

## (undated) DEVICE — TUBING STRYKER PNEUMOCLEAR HIGH FLOW

## (undated) DEVICE — GLV 6 PROTEXIS (WHITE)

## (undated) DEVICE — INSUFFLATION NDL COVIDIEN STEP 14G FOR STEP/VERSASTEP

## (undated) DEVICE — TROCAR ETHICON ENDOPATH XCEL BLADELESS 11MM X 100MM STABILITY

## (undated) DEVICE — SOL IRR POUR H2O 500ML

## (undated) DEVICE — SYR LUER LOK 10CC

## (undated) DEVICE — ELCTR STRYKER NEPTUNE SMOKE EVACUATION PENCIL (GREEN)

## (undated) DEVICE — SUT ETHIBOND 0 44" EN3

## (undated) DEVICE — SOL IRR POUR H2O 250ML

## (undated) DEVICE — TROCAR ETHICON ENDOPATH XCEL UNIVERSAL SLEEVE WITH OPTIVIEW 5MM X 100MM

## (undated) DEVICE — WARMING BLANKET UPPER ADULT

## (undated) DEVICE — BLADE SURGICAL #15 CARBON

## (undated) DEVICE — ENDOCATCH 10MM SPECIMEN POUCH

## (undated) DEVICE — SUT POLYSORB 3-0 30" V-20 UNDYED

## (undated) DEVICE — PREP CHLOROHEXIDINE 4% 118CC KIT

## (undated) DEVICE — TUBING STRYKEFLOW II SUCTION / IRRIGATOR

## (undated) DEVICE — TROCAR COVIDIEN VERSAPORT BLADELESS OPTICAL 5MM STANDARD

## (undated) DEVICE — DRAPE INSTRUMENT POUCH 6.75" X 11"

## (undated) DEVICE — SUT VICRYL 3-0 27" SH UNDYED

## (undated) DEVICE — SHEARS COVIDIEN ENDO SHEAR 5MM X 45CM LONG W MONOPOLAR CAUTERY

## (undated) DEVICE — SUT POLYSORB 0 36" GU-46

## (undated) DEVICE — LABELS BLANK W PEN

## (undated) DEVICE — LIGASURE MARYLAND 44CM

## (undated) DEVICE — SOL INJ NS 0.9% 1000ML

## (undated) DEVICE — DISSECTOR ENDOSCOPIC KITTNER SINGLE TIP

## (undated) DEVICE — POSITIONER FOAM EGG CRATE ULNAR 2PCS (PINK)

## (undated) DEVICE — TROCAR COVIDIEN VERSAONE FIXATION CANNULA 5MM

## (undated) DEVICE — VISIGI 3D SLEEVE GASTRECTOMY 36FR

## (undated) DEVICE — SPECIMEN CONTAINER 100ML

## (undated) DEVICE — TROCAR ETHICON ENDOPATH XCEL BLADELESS 5MM X 100MM STABILITY